# Patient Record
Sex: FEMALE | Race: WHITE | NOT HISPANIC OR LATINO | Employment: FULL TIME | URBAN - METROPOLITAN AREA
[De-identification: names, ages, dates, MRNs, and addresses within clinical notes are randomized per-mention and may not be internally consistent; named-entity substitution may affect disease eponyms.]

---

## 2023-10-02 ENCOUNTER — ANCILLARY PROCEDURE (OUTPATIENT)
Dept: RADIOLOGY | Facility: CLINIC | Age: 63
End: 2023-10-02
Payer: COMMERCIAL

## 2023-10-02 VITALS — BODY MASS INDEX: 22.66 KG/M2 | HEIGHT: 66 IN | WEIGHT: 141 LBS

## 2023-10-02 DIAGNOSIS — Z12.31 ENCOUNTER FOR SCREENING MAMMOGRAM FOR MALIGNANT NEOPLASM OF BREAST: ICD-10-CM

## 2023-10-02 PROCEDURE — 77067 SCR MAMMO BI INCL CAD: CPT | Mod: 50

## 2023-10-02 PROCEDURE — 77063 BREAST TOMOSYNTHESIS BI: CPT | Mod: BILATERAL PROCEDURE | Performed by: RADIOLOGY

## 2023-10-02 PROCEDURE — 77063 BREAST TOMOSYNTHESIS BI: CPT

## 2023-10-02 PROCEDURE — 77067 SCR MAMMO BI INCL CAD: CPT | Mod: BILATERAL PROCEDURE | Performed by: RADIOLOGY

## 2024-03-04 ENCOUNTER — OFFICE VISIT (OUTPATIENT)
Dept: PAIN MEDICINE | Facility: HOSPITAL | Age: 64
End: 2024-03-04
Payer: COMMERCIAL

## 2024-03-04 VITALS
BODY MASS INDEX: 23.22 KG/M2 | RESPIRATION RATE: 18 BRPM | SYSTOLIC BLOOD PRESSURE: 125 MMHG | HEIGHT: 66 IN | WEIGHT: 144.5 LBS | DIASTOLIC BLOOD PRESSURE: 69 MMHG | HEART RATE: 75 BPM

## 2024-03-04 DIAGNOSIS — M54.16 LUMBAR NEURITIS: ICD-10-CM

## 2024-03-04 DIAGNOSIS — M46.1 SACROILIITIS (CMS-HCC): Primary | ICD-10-CM

## 2024-03-04 DIAGNOSIS — M96.1 POSTLAMINECTOMY SYNDROME: ICD-10-CM

## 2024-03-04 PROCEDURE — 1125F AMNT PAIN NOTED PAIN PRSNT: CPT | Performed by: PHYSICAL MEDICINE & REHABILITATION

## 2024-03-04 PROCEDURE — 99204 OFFICE O/P NEW MOD 45 MIN: CPT | Performed by: PHYSICAL MEDICINE & REHABILITATION

## 2024-03-04 PROCEDURE — 1036F TOBACCO NON-USER: CPT | Performed by: PHYSICAL MEDICINE & REHABILITATION

## 2024-03-04 PROCEDURE — 1159F MED LIST DOCD IN RCRD: CPT | Performed by: PHYSICAL MEDICINE & REHABILITATION

## 2024-03-04 PROCEDURE — 99214 OFFICE O/P EST MOD 30 MIN: CPT | Performed by: PHYSICAL MEDICINE & REHABILITATION

## 2024-03-04 RX ORDER — FLUOXETINE HYDROCHLORIDE 40 MG/1
40 CAPSULE ORAL DAILY
COMMUNITY

## 2024-03-04 RX ORDER — ATORVASTATIN CALCIUM 40 MG/1
40 TABLET, FILM COATED ORAL DAILY
COMMUNITY

## 2024-03-04 RX ORDER — LOSARTAN POTASSIUM AND HYDROCHLOROTHIAZIDE 12.5; 5 MG/1; MG/1
1 TABLET ORAL DAILY
COMMUNITY

## 2024-03-04 RX ORDER — HYDROCHLOROTHIAZIDE 25 MG/1
50 TABLET ORAL DAILY
COMMUNITY
End: 2024-05-30 | Stop reason: ALTCHOICE

## 2024-03-04 ASSESSMENT — PATIENT HEALTH QUESTIONNAIRE - PHQ9
SUM OF ALL RESPONSES TO PHQ9 QUESTIONS 1 AND 2: 0
2. FEELING DOWN, DEPRESSED OR HOPELESS: NOT AT ALL
1. LITTLE INTEREST OR PLEASURE IN DOING THINGS: NOT AT ALL

## 2024-03-04 ASSESSMENT — ENCOUNTER SYMPTOMS
LOSS OF SENSATION IN FEET: 0
DEPRESSION: 0
OCCASIONAL FEELINGS OF UNSTEADINESS: 1

## 2024-03-04 ASSESSMENT — PAIN SCALES - GENERAL: PAINLEVEL: 4

## 2024-03-04 NOTE — PROGRESS NOTES
New pt c/o low back pain, bilat hips, left sciatica, radiates to left leg-to mid thigh at times. Had microdiscectomy about 15 yrs ago. Problems started started to become worse last 3 yrs. Tries to walk or if stands too long makes worse. States about 4 mths ago if sits too long, feels pain left sciatica area when stands up-describes as sharp, affects walking.    Pain Score 4/10  Meds-tylenol-helps some    Had hip x-rayed Dec 2023, Had MRI 1/11/24  PT for 6 weeks last fall 2023. States did not help. Ices back every AM.    OA 3/4/24  Oswestry Score: 18    Subjective   Patient ID: Luba Humphreys is a 63 y.o. female who presents for Back Pain (Low back, bilat hips, left sciatica).  HPI    Pleasant 63-year-old female with lower back pain with some radiation to her left buttock and thigh.  Patient has a history of L5-S1 microdiscectomy about 15 years ago.  Last year or so she started noticing increasing pain as above.  Pain is worse with transitioning from sitting to standing as well as moving around the bed.  Denies any lower extremity weakness, denies any bowel or bladder changes.  She is taken Tylenol which does help and is not really interested in any other medications at this time consistently.  She has previously done physical therapy at the end of last year and is continuing to do her home stretching and exercises.                  Monitoring and compliance:    ORT:    PDUQ:    Office Agreement: 3/4/24  Oswestry score: 18      Review of Systems   All other systems reviewed and are negative.       Current Outpatient Medications   Medication Instructions    atorvastatin (LIPITOR) 40 mg, oral, Daily    FLUoxetine (PROZAC) 40 mg, oral, Daily    hydroCHLOROthiazide (HYDRODIURIL) 50 mg, oral, Daily        Past Medical History:   Diagnosis Date    Personal history of other diseases of the circulatory system     History of hypertension    Personal history of other endocrine, nutritional and metabolic disease     History  of hyperlipidemia        Past Surgical History:   Procedure Laterality Date    BREAST CYST ASPIRATION Bilateral     OTHER SURGICAL HISTORY  2020     section    OTHER SURGICAL HISTORY  2020    Appendectomy    OTHER SURGICAL HISTORY  2020    Back surgery    OTHER SURGICAL HISTORY  2020    Hand surgery    OTHER SURGICAL HISTORY  2022    Cystoscopy        Family History   Problem Relation Name Age of Onset    Throat cancer Maternal Grandmother          Allergies   Allergen Reactions    Ibuprofen Hives and Itching        Objective     Vitals:    24 1005   BP: 125/69   Pulse: 75   Resp: 18        Physical Exam    GENERAL EXAM  Vital Signs: Vital signs to include heart rate, respiration rate, blood pressure, and temperature were reviewed.  General Appearance:  Awake, alert, healthy appearing, well developed, No acute distress.  Head: Normocephalic without evidence of head injury.  Neck: The appearance of the neck was normal without swelling with a midline trachea.  Eyes: The eyelids and eyebrows exhibited no abnormalities.  Pupils were not pin-point.  Sclera was without icterus.  Lungs: Respiration rhythm and depth was normal.  Respiratory movements were normal without labored breathing.  Cardiovascular: No peripheral edema was present.    Neurological: Patient was oriented to time, place, and person.  Speech was normal.  Balance, gait, and stance were unremarkable.    Psychiatric: Appearance was normal with appropriate dress.  Mood was euthymic and affect was normal.  Skin: Affected regions were without ecchymosis or skin lesions.    Back (MSK/neuro/skin):  Full active and passive range of motion in all planes  Strength 5 out of 5 bilateral lower extremities  Sensation to light-touch grossly intact bilateral lower extremities  Deep tendon reflexes equal bilateral lower extremities  No edema/effusion/erythema  Skin: no skin lesions or scars  B SLR (-)  B sacral spring test (-),    B facet load (-)  B SIJ tenderness (-)   B IJEOMA (-)  Full flexion/extension  No TTP, no muscle spasm over lumbar paraspinals    Physical exam as above except:  Positive Nyasia finger bilaterally, positive Ijeoma bilaterally, positive Gaenslen's bilaterally, positive sacral thrust bilaterally, some tenderness palpation over lumbar paraspinals.      Assessment/Plan   Diagnoses and all orders for this visit:  Sacroiliitis (CMS/HCC)  -     Sacroiliac Joint Injection; Future  Postlaminectomy syndrome  Lumbar neuritis    63-year-old female with history of prior L5-S1 microdiscectomy.  I did personally review patient's MRI of her lumbar spine as she did bring in the disc has was done at an outside facility which showed some degenerative changes worse at L1-2 with some disc desiccation as well as some type II Modic changes at that level with some mild central canal stenosis.  There is no high-grade foraminal or canal stenosis though.  Her symptoms are more consistent today with left greater than right sacroiliitis given positive findings on physical exam as well as her history.  Given she has failed conservative treatment to include physical therapy as well as over-the-counter medications it would be reasonable to proceed with bilateral SI joint injection.  Plan for today:  - Schedule patient for bilateral sacroiliac joint injection.  - If patient does not have relief of some of the radicular symptoms some of this may be coming from more the L1-2 degenerative changes so we could potentially do epidural steroid injections in the future though I do suspect that her primary pain is the SI joint pain.  - Continue with home physical therapy.  - Could consider the addition of a neuromodulator such as gabapentin in the future.         This note was generated with the aid of dictation software, there may be typos despite my attempts at proofreading.

## 2024-03-14 ENCOUNTER — HOSPITAL ENCOUNTER (OUTPATIENT)
Dept: GASTROENTEROLOGY | Facility: HOSPITAL | Age: 64
Discharge: HOME | End: 2024-03-14
Payer: COMMERCIAL

## 2024-03-14 ENCOUNTER — HOSPITAL ENCOUNTER (OUTPATIENT)
Dept: RADIOLOGY | Facility: HOSPITAL | Age: 64
Discharge: HOME | End: 2024-03-14
Payer: COMMERCIAL

## 2024-03-14 VITALS
TEMPERATURE: 98.5 F | RESPIRATION RATE: 16 BRPM | SYSTOLIC BLOOD PRESSURE: 133 MMHG | HEIGHT: 66 IN | HEART RATE: 54 BPM | OXYGEN SATURATION: 100 % | BODY MASS INDEX: 22.5 KG/M2 | WEIGHT: 140 LBS | DIASTOLIC BLOOD PRESSURE: 69 MMHG

## 2024-03-14 DIAGNOSIS — M46.1 SACROILIITIS (CMS-HCC): ICD-10-CM

## 2024-03-14 PROCEDURE — 2500000004 HC RX 250 GENERAL PHARMACY W/ HCPCS (ALT 636 FOR OP/ED): Performed by: PHYSICAL MEDICINE & REHABILITATION

## 2024-03-14 PROCEDURE — 27096 INJECT SACROILIAC JOINT: CPT | Mod: 50 | Performed by: PHYSICAL MEDICINE & REHABILITATION

## 2024-03-14 PROCEDURE — 2550000001 HC RX 255 CONTRASTS: Performed by: PHYSICAL MEDICINE & REHABILITATION

## 2024-03-14 PROCEDURE — 27096 INJECT SACROILIAC JOINT: CPT | Performed by: PHYSICAL MEDICINE & REHABILITATION

## 2024-03-14 PROCEDURE — 2500000005 HC RX 250 GENERAL PHARMACY W/O HCPCS: Performed by: PHYSICAL MEDICINE & REHABILITATION

## 2024-03-14 RX ORDER — METHYLPREDNISOLONE ACETATE 40 MG/ML
INJECTION, SUSPENSION INTRA-ARTICULAR; INTRALESIONAL; INTRAMUSCULAR; SOFT TISSUE AS NEEDED
Status: COMPLETED | OUTPATIENT
Start: 2024-03-14 | End: 2024-03-14

## 2024-03-14 RX ORDER — LIDOCAINE HYDROCHLORIDE 5 MG/ML
INJECTION, SOLUTION INFILTRATION; INTRAVENOUS AS NEEDED
Status: COMPLETED | OUTPATIENT
Start: 2024-03-14 | End: 2024-03-14

## 2024-03-14 RX ADMIN — METHYLPREDNISOLONE ACETATE 40 MG: 40 INJECTION, SUSPENSION INTRA-ARTICULAR; INTRALESIONAL; INTRAMUSCULAR; SOFT TISSUE at 10:17

## 2024-03-14 RX ADMIN — LIDOCAINE HYDROCHLORIDE 15 ML: 5 INJECTION, SOLUTION INFILTRATION at 10:13

## 2024-03-14 RX ADMIN — IOHEXOL 5 ML: 350 INJECTION, SOLUTION INTRAVENOUS at 10:16

## 2024-03-14 ASSESSMENT — PAIN SCALES - GENERAL
PAINLEVEL_OUTOF10: 0 - NO PAIN
PAINLEVEL_OUTOF10: 5 - MODERATE PAIN

## 2024-03-14 ASSESSMENT — PAIN - FUNCTIONAL ASSESSMENT
PAIN_FUNCTIONAL_ASSESSMENT: 0-10
PAIN_FUNCTIONAL_ASSESSMENT: 0-10

## 2024-03-14 ASSESSMENT — COLUMBIA-SUICIDE SEVERITY RATING SCALE - C-SSRS
1. IN THE PAST MONTH, HAVE YOU WISHED YOU WERE DEAD OR WISHED YOU COULD GO TO SLEEP AND NOT WAKE UP?: NO
6. HAVE YOU EVER DONE ANYTHING, STARTED TO DO ANYTHING, OR PREPARED TO DO ANYTHING TO END YOUR LIFE?: NO
2. HAVE YOU ACTUALLY HAD ANY THOUGHTS OF KILLING YOURSELF?: NO

## 2024-03-14 ASSESSMENT — PAIN DESCRIPTION - DESCRIPTORS: DESCRIPTORS: ACHING

## 2024-03-14 NOTE — H&P
HISTORY AND PHYSICAL    History Of Present Illness  Luba Humphreys is a 63 y.o. female presenting with chronic pain.  Here for bilateral sacroiliac joint injection    she denies any recent antibiotic use or infections, she denies any blood thinner use , and she denies contrast or local anesthetic allergies     Past Medical History  Past Medical History:   Diagnosis Date    Chronic back pain     Personal history of other diseases of the circulatory system     History of hypertension    Personal history of other endocrine, nutritional and metabolic disease     History of hyperlipidemia       Surgical History  Past Surgical History:   Procedure Laterality Date    BREAST CYST ASPIRATION Bilateral     OTHER SURGICAL HISTORY  2020     section    OTHER SURGICAL HISTORY  2020    Appendectomy    OTHER SURGICAL HISTORY  2020    Back surgery    OTHER SURGICAL HISTORY  2020    Hand surgery    OTHER SURGICAL HISTORY  2022    Cystoscopy        Social History  She reports that she has never smoked. She has never used smokeless tobacco. She reports current alcohol use of about 1.0 standard drink of alcohol per week. She reports that she does not use drugs.    Family History  Family History   Problem Relation Name Age of Onset    Throat cancer Maternal Grandmother          Allergies  Ibuprofen    Review of Systems   12 point ROS done and negative except for the above.   Physical Exam     General: NAD, well groomed, well nourished  Eyes: Non-icteric sclera, EOMI  Ears, Nose, Mouth, and Throat: External ears and nose appear to be without deformity or rash. No lesions or masses noted. Hearing is grossly intact.   Neck: Trachea midline  Respiratory: Nonlabored breathing   Cardiovascular: No peripheral edema   Skin: No rashes or open lesions/ulcers identified on skin.    Last Recorded Vitals  Blood pressure 129/82, pulse 60, temperature 36.9 °C (98.5 °F), temperature source Temporal, resp. rate 16,  "height 1.676 m (5' 6\"), weight 63.5 kg (140 lb), SpO2 100 %.    Relevant Results           Assessment/Plan       Risks, benefits, alternatives discussed. All questions answered to the best of my ability. Patient agrees to proceed.   -We will proceed with planned procedure        Azeem Wahl MD  Chronic Pain Fellow  [facility]    "

## 2024-03-14 NOTE — PROCEDURES
Joint Aspiration/Injection    Date/Time: 3/14/2024 10:13 AM    Performed by: Brain Awad MD  Authorized by: Brain Awad MD    Consent:     Consent obtained:  Written    Consent given by:  Patient    Risks, benefits, and alternatives were discussed: yes      Risks discussed:  Bleeding, infection, pain and nerve damage    Alternatives discussed:  No treatment, delayed treatment and alternative treatment  Universal protocol:     Procedure explained and questions answered to patient or proxy's satisfaction: yes      Relevant documents present and verified: yes      Test results available: yes      Imaging studies available: yes      Required blood products, implants, devices, and special equipment available: yes      Site/side marked: yes      Immediately prior to procedure, a time out was called: yes      Patient identity confirmed:  Verbally with patient, hospital-assigned identification number and arm band  Comments:      SI joint injection    Following informed consent, the patient was operating room and placed in the prone position. The low back area was prepped and draped with chlorhexidine in sterile fashion.  Using fluoroscopic guidance, the skin and subcutaneous tissue overlying needle trajectory to the lower aspect of the sacroiliac joint was anesthetized with 3 cc of 0.5% Lidocaine.  A 22-gauge spinal needle was then advanced under fluoroscopic guidance the lower aspect of the sacroiliac joint.  Injection of a small amount of contrast with appropriate intra-articular/periarticular spread.  Thereafter the below medications were injected and the needle was removed.  The patient was then transferred to the recovery room in stable condition.    The patient is to continue with physical therapy/home exercises and update us on response/progress.    Laterality: Bilateral  Medication(s):  [3mL] [0.5% lidocaine] [40 mg methylprednisolone] divided between site(s)

## 2024-03-14 NOTE — DISCHARGE INSTRUCTIONS
You had a pain management procedure today.    Observe/ monitor for the following signs & symptoms:  If you notice Excessive bleeding (slow general oozing that completely soaks the dressing, or fresh bright red bleeding).   In either case, apply pressure to the area, elevate it if possible & call your doctor at once.    Also observe for:  Change in color  Numbness/tingling  Coldness to the touch  Swelling  Drainage  Temperature of 101.5 or higher.  Increased, uncontrollable pain.    *If you notice the above signs & symptoms, please call your doctor right away!*      Discharge Instructions:    Your pain may not be gone immediately after this procedure; it generally takes 3 to 5 days for the steroid to work.   Keep the needle site clean & dry for 24 hours.  Continue your present medications.  Make an appointment to see your doctor in 2-3 weeks.  If any problems occur, or if you have any further questions, please call as soon as possible. If you find that you cannot reach your doctor, but feel that the condition nees a doctor's attention, go to the closest emergency department & take this discharge paper with you.       Dr. Awad's Office: [phone]

## 2024-03-14 NOTE — H&P
Patient ID: Patient with sacroiliitis who presents for the scheduled procedure.  No changes since last visit      Patient denies any recent antibiotic use or infections, denies any blood thinner use, and denies contrast or local anesthetic allergies           GENERAL EXAM  Vital Signs: Vital signs to include heart rate, respiration rate, blood pressure, and temperature were reviewed.  General Appearance:  Awake, alert, healthy appearing, well developed, No acute distress.  Head: Normocephalic without evidence of head injury.  Neck: The appearance of the neck was normal without swelling with a midline trachea.  Eyes: The eyelids and eyebrows exhibited no abnormalities.  Pupils were not pin-point.  Sclera was without icterus.  Lungs: Respiration rhythm and depth was normal.  Respiratory movements were normal without labored breathing.  Cardiovascular: No peripheral edema was present.    Neurological: Patient was oriented to time, place, and person.  Speech was normal.  Balance, gait, and stance were unremarkable.    Psychiatric: Appearance was normal with appropriate dress.  Mood was euthymic and affect was normal.  Skin: Affected regions were without ecchymosis or skin lesions.        Physical exam as above except:        Assessment/Plan    Patient with sacroiliitis here for bilateral SI joint injection

## 2024-03-18 ENCOUNTER — HOSPITAL ENCOUNTER (OUTPATIENT)
Dept: RADIOLOGY | Facility: EXTERNAL LOCATION | Age: 64
Discharge: HOME | End: 2024-03-18

## 2024-04-11 ENCOUNTER — OFFICE VISIT (OUTPATIENT)
Dept: PAIN MEDICINE | Facility: HOSPITAL | Age: 64
End: 2024-04-11
Payer: COMMERCIAL

## 2024-04-11 VITALS
DIASTOLIC BLOOD PRESSURE: 68 MMHG | OXYGEN SATURATION: 96 % | HEIGHT: 66 IN | SYSTOLIC BLOOD PRESSURE: 101 MMHG | BODY MASS INDEX: 23.13 KG/M2 | HEART RATE: 74 BPM | RESPIRATION RATE: 16 BRPM | WEIGHT: 143.9 LBS

## 2024-04-11 DIAGNOSIS — M96.1 POSTLAMINECTOMY SYNDROME: ICD-10-CM

## 2024-04-11 DIAGNOSIS — M46.1 SACROILIITIS (CMS-HCC): ICD-10-CM

## 2024-04-11 DIAGNOSIS — M54.16 LUMBAR NEURITIS: Primary | ICD-10-CM

## 2024-04-11 PROCEDURE — 1036F TOBACCO NON-USER: CPT | Performed by: CLINICAL NURSE SPECIALIST

## 2024-04-11 PROCEDURE — 99214 OFFICE O/P EST MOD 30 MIN: CPT | Performed by: CLINICAL NURSE SPECIALIST

## 2024-04-11 PROCEDURE — 1159F MED LIST DOCD IN RCRD: CPT | Performed by: CLINICAL NURSE SPECIALIST

## 2024-04-11 PROCEDURE — 1160F RVW MEDS BY RX/DR IN RCRD: CPT | Performed by: CLINICAL NURSE SPECIALIST

## 2024-04-11 ASSESSMENT — COLUMBIA-SUICIDE SEVERITY RATING SCALE - C-SSRS
2. HAVE YOU ACTUALLY HAD ANY THOUGHTS OF KILLING YOURSELF?: NO
1. IN THE PAST MONTH, HAVE YOU WISHED YOU WERE DEAD OR WISHED YOU COULD GO TO SLEEP AND NOT WAKE UP?: NO
6. HAVE YOU EVER DONE ANYTHING, STARTED TO DO ANYTHING, OR PREPARED TO DO ANYTHING TO END YOUR LIFE?: NO

## 2024-04-11 ASSESSMENT — PATIENT HEALTH QUESTIONNAIRE - PHQ9
2. FEELING DOWN, DEPRESSED OR HOPELESS: NOT AT ALL
1. LITTLE INTEREST OR PLEASURE IN DOING THINGS: NOT AT ALL
SUM OF ALL RESPONSES TO PHQ9 QUESTIONS 1 AND 2: 0

## 2024-04-11 ASSESSMENT — ENCOUNTER SYMPTOMS
OCCASIONAL FEELINGS OF UNSTEADINESS: 0
LOSS OF SENSATION IN FEET: 0
DEPRESSION: 0

## 2024-04-11 NOTE — ASSESSMENT & PLAN NOTE
63-year-old female with history of prior L5-S1 microdiscectomy.  Previous review of MRI consistent with degenerative changes worse at L1-2 with some disc desiccation as well as some type II Modic changes at that level with some mild central canal stenosis.  Previously completed formal physical therapy and consistently does home therapy exercises and stretches daily.  She has wanted to return to her gym, however, pain has limited her ability to do gym exercises. Symptoms were consistent with left greater than right sacroiliitis at her last office visit.  She does appear to have received some relief from her bilateral SI joint injection as she has less tenderness over her SI joints with negative provocative maneuvers.  She is experiencing persistent lumbar radicular symptoms most bothersome to left lower extremity.  Patient's symptoms and physical exam consistent with a lumbar neuritis.  Patient may benefit from a caudal epidural steroid injection targeting symptoms of lumbar neuritis.  Reviewed potential risks and benefits and the patient would like to proceed.  Plan reviewed with patient at today's visit.    -Scheduled for caudal epidural steroid injection under fluoroscopic guidance targeting symptoms of lumbar neuritis.  CPT 61086.  -The patient may benefit from a neuromodulator such as gabapentin in the future if needed.  Currently continues to manage her pain with occasional Tylenol.  -Patient will follow-up in our office approximately 4 weeks after her injection for reevaluation.     Disclaimer: This note was transcribed using an audio transcription device.  As such, minor errors may be present with regard to spelling, punctuation, and inadvertent word insertion.  Please disregard such errors.

## 2024-04-11 NOTE — PROGRESS NOTES
Subjective   Patient ID: Luba Humphreys is a 63 y.o. female who presents for lumbar radicular pain, SI pain      HPI    63-year-old female with chronic low back pain radiating to left buttock to left lower extremity presents for follow-up.  Patient has a history of an L5-S1 microdiscectomy about 15 years ago.  She has experienced increasing low back pain radiating into her left buttock and left thigh for the past year.  She has completed a formal course of physical therapy.  Continually does home therapy exercises and stretches daily.  Lumbar MRI consistent with degenerative changes worse at L1-2 with some disc desiccation as well as some type II Modic changes at that level with some mild central canal stenosis.  There is no high-grade foraminal or canal stenosis.  She has her pain with some Tylenol.  She was scheduled for bilateral SI joint injections at her last office visit targeting sacroiliitis.  She presents at today's office visit with low back pain which will radiate into bilateral buttocks/hips and into her left thigh.  Recent bilateral SI joint injection has provided some relief of left buttock pain.  She states that this pain is not as intense, however, she continues to experience left sided radicular symptoms.  Pain increases with going from seated to standing position, standing for and walking.  She denies any numbness/tingling or weakness in her lower extremities.  She denies any bowel or bladder changes.  Continues to do home therapy stretches and exercises daily.  She would like to return to her gym, however she has held off secondary to pain.    Patient presents to office for interval follow up. Patient is seen for lower back pain that radiates into the bilateral hips and intermittently down the left leg stopping at the midthigh. Pain is increased with walking. Patient presents for follow up post bilateral SI injection with minor relief approx 25% states that left sciatic pain is still constant  but at a subdued level.    Pain Score: 3/10    Other Medications/Neuromodulators: none    Injections/Procedures: bilateral SI 03/14/2024 with 25% relief    Other:  denies    OARRS:  Kimmy Espino, APRN-CNP, APRN-CNS on 4/11/2024  1:06 PM  I have personally reviewed the OARRS report for Luba Humphreys. I have considered the risks of abuse, dependence, addiction and diversion    Is the patient prescribed a combination of a benzodiazepine and opioid?  No    Last Urine Drug Screen / ordered today: No  No results found for this or any previous visit (from the past 8760 hour(s)).  N/A    Controlled Substance Agreement:  Date of the Last Agreement:  n/a      Monitoring and compliance:    ORT: n/a    PDUQ: n/a    Office Agreement: 03/04/2024      Review of Systems    ROS:   General: No fevers, chills, weight loss  Skin: Negative for lesions  Eyes: No acute vision changes  Ears: No vertigo  Nose, mouth, throat: No difficulty swallowing or speaking  Respiratory: No cough, shortness of breath, cyanosis  Cardiovascular: Negative for chest pain syncope or palpitation  Gastrointestinal: No constipation, nausea, vomiting  Neurological: Negative for headache,   Psychological: Negative for severe or debilitating anxiety, depression. Negative memory loss  Musculoskeletal: Positive for arthralgia, myalgia and pain  Endocrine: Negative for weight gain, appetite changes, excessive sweating  Allergy/immune: Negative    All 13 systems were reviewed and are within normal levels except as noted or in the history of present illness.  Positive or pertinent negative responses are noted or were in the history of present illness. As noted, the patient denies significant or impairing weakness in the bilateral upper and lower extremities, medication induced constipation, and bowel or bladder incontinence.     Current Outpatient Medications:     atorvastatin (Lipitor) 40 mg tablet, Take 1 tablet (40 mg) by mouth once daily., Disp: , Rfl:      "FLUoxetine (PROzac) 40 mg capsule, Take 1 capsule (40 mg) by mouth once daily., Disp: , Rfl:     hydroCHLOROthiazide (HYDRODiuril) 25 mg tablet, Take 2 tablets (50 mg) by mouth once daily., Disp: , Rfl:     losartan-hydrochlorothiazide (Hyzaar) 50-12.5 mg tablet, Take 1 tablet by mouth once daily., Disp: , Rfl:      Past Medical History:   Diagnosis Date    Chronic back pain     Personal history of other diseases of the circulatory system     History of hypertension    Personal history of other endocrine, nutritional and metabolic disease     History of hyperlipidemia        Past Surgical History:   Procedure Laterality Date    BREAST CYST ASPIRATION Bilateral     OTHER SURGICAL HISTORY  2020     section    OTHER SURGICAL HISTORY  2020    Appendectomy    OTHER SURGICAL HISTORY  2020    Back surgery    OTHER SURGICAL HISTORY  2020    Hand surgery    OTHER SURGICAL HISTORY  2022    Cystoscopy        Family History   Problem Relation Name Age of Onset    Throat cancer Maternal Grandmother          Allergies   Allergen Reactions    Ibuprofen Hives and Itching        Objective     Visit Vitals  /68   Pulse 74   Resp 16   Ht 1.676 m (5' 6\")   Wt 65.3 kg (143 lb 14.4 oz)   LMP  (LMP Unknown)   SpO2 96%   BMI 23.23 kg/m²   OB Status Postmenopausal   Smoking Status Never   BSA 1.74 m²        Physical Exam    PE:  General: Well-developed, well-nourished, no acute distress. The patient demonstrates no pain behavior, symptom magnification or overt drug-seeking behavior.  Eye: Pupils appropriate for room lighting  Neck/thyroid: No obvious goiter or enlargement of neck noted  Respiratory exam: Normal respiratory effort, unlabored respiration. No accessory muscle use noted  Cardiac exam: Bilateral radial pulses intact  Abdominal: Nondistended  Spine, lumbar: The patient is able to rise from a seated to standing position without hesitancy, push off, or delay. Gait is grossly nonantalgic. " Tenderness to paraspinous musculature is noted lower lumbar spine. Flexion and extension intact.  Extension increasing pain to left side low back. Positive SLR LLE. Mild tenderness over L SIJ.  Negative Ijeoma, Gaenslen's and sacral thrust bilaterally.   Neurologic exam: Muscle strength is antigravity in all 4 extremities.  Equal muscle strength bilateral lower extremities 5/5.  Psychiatric exam: Judgment and insight normal, affect normal, speech is fluent, affect appropriate, demonstrating no signs of hypersomnolence, sedation, or confusion      Assessment/Plan   Problem List Items Addressed This Visit             ICD-10-CM    Postlaminectomy syndrome M96.1    Relevant Orders    Epidural Steroid Injection    Lumbar neuritis - Primary M54.16     63-year-old female with history of prior L5-S1 microdiscectomy.  Previous review of MRI consistent with degenerative changes worse at L1-2 with some disc desiccation as well as some type II Modic changes at that level with some mild central canal stenosis.  Previously completed formal physical therapy and consistently does home therapy exercises and stretches daily.  She has wanted to return to her gym, however, pain has limited her ability to do gym exercises. Symptoms were consistent with left greater than right sacroiliitis at her last office visit.  She does appear to have received some relief from her bilateral SI joint injection as she has less tenderness over her SI joints with negative provocative maneuvers.  She is experiencing persistent lumbar radicular symptoms most bothersome to left lower extremity.  Patient's symptoms and physical exam consistent with a lumbar neuritis.  Patient may benefit from a caudal epidural steroid injection targeting symptoms of lumbar neuritis.  Reviewed potential risks and benefits and the patient would like to proceed.  Plan reviewed with patient at today's visit.    -Scheduled for caudal epidural steroid injection under fluoroscopic  guidance targeting symptoms of lumbar neuritis.  CPT 04570.  -The patient may benefit from a neuromodulator such as gabapentin in the future if needed.  Currently continues to manage her pain with occasional Tylenol.  -Patient will follow-up in our office approximately 4 weeks after her injection for reevaluation.     Disclaimer: This note was transcribed using an audio transcription device.  As such, minor errors may be present with regard to spelling, punctuation, and inadvertent word insertion.  Please disregard such errors.           Relevant Orders    Epidural Steroid Injection    Sacroiliitis (CMS/[facility]) M46.1

## 2024-05-09 ENCOUNTER — HOSPITAL ENCOUNTER (OUTPATIENT)
Dept: RADIOLOGY | Facility: HOSPITAL | Age: 64
Discharge: HOME | End: 2024-05-09
Payer: COMMERCIAL

## 2024-05-09 ENCOUNTER — HOSPITAL ENCOUNTER (OUTPATIENT)
Dept: GASTROENTEROLOGY | Facility: HOSPITAL | Age: 64
Discharge: HOME | End: 2024-05-09
Payer: COMMERCIAL

## 2024-05-09 VITALS
DIASTOLIC BLOOD PRESSURE: 77 MMHG | SYSTOLIC BLOOD PRESSURE: 139 MMHG | TEMPERATURE: 97.8 F | OXYGEN SATURATION: 97 % | RESPIRATION RATE: 16 BRPM | HEART RATE: 61 BPM

## 2024-05-09 DIAGNOSIS — M96.1 POSTLAMINECTOMY SYNDROME: ICD-10-CM

## 2024-05-09 DIAGNOSIS — M54.16 LUMBAR NEURITIS: ICD-10-CM

## 2024-05-09 PROCEDURE — 7100000010 HC PHASE TWO TIME - EACH INCREMENTAL 1 MINUTE

## 2024-05-09 PROCEDURE — 7100000009 HC PHASE TWO TIME - INITIAL BASE CHARGE

## 2024-05-09 PROCEDURE — 62323 NJX INTERLAMINAR LMBR/SAC: CPT | Performed by: PHYSICAL MEDICINE & REHABILITATION

## 2024-05-09 PROCEDURE — 2500000004 HC RX 250 GENERAL PHARMACY W/ HCPCS (ALT 636 FOR OP/ED): Performed by: PHYSICAL MEDICINE & REHABILITATION

## 2024-05-09 PROCEDURE — 2500000005 HC RX 250 GENERAL PHARMACY W/O HCPCS: Performed by: PHYSICAL MEDICINE & REHABILITATION

## 2024-05-09 PROCEDURE — 2550000001 HC RX 255 CONTRASTS: Performed by: PHYSICAL MEDICINE & REHABILITATION

## 2024-05-09 RX ORDER — METHYLPREDNISOLONE ACETATE 40 MG/ML
INJECTION, SUSPENSION INTRA-ARTICULAR; INTRALESIONAL; INTRAMUSCULAR; SOFT TISSUE AS NEEDED
Status: COMPLETED | OUTPATIENT
Start: 2024-05-09 | End: 2024-05-09

## 2024-05-09 RX ORDER — LIDOCAINE HYDROCHLORIDE 5 MG/ML
INJECTION, SOLUTION INFILTRATION; INTRAVENOUS AS NEEDED
Status: COMPLETED | OUTPATIENT
Start: 2024-05-09 | End: 2024-05-09

## 2024-05-09 RX ADMIN — METHYLPREDNISOLONE ACETATE 40 MG: 40 INJECTION, SUSPENSION INTRA-ARTICULAR; INTRALESIONAL; INTRAMUSCULAR; SOFT TISSUE at 08:29

## 2024-05-09 RX ADMIN — LIDOCAINE HYDROCHLORIDE 15 ML: 5 INJECTION, SOLUTION INFILTRATION at 08:28

## 2024-05-09 RX ADMIN — IOHEXOL 5 ML: 350 INJECTION, SOLUTION INTRAVENOUS at 08:29

## 2024-05-09 ASSESSMENT — PAIN SCALES - GENERAL
PAINLEVEL_OUTOF10: 4
PAINLEVEL_OUTOF10: 3

## 2024-05-09 ASSESSMENT — PAIN - FUNCTIONAL ASSESSMENT: PAIN_FUNCTIONAL_ASSESSMENT: 0-10

## 2024-05-09 NOTE — H&P
Patient ID: Patient with postlaminectomy syndrome who presents for the scheduled procedure.  No changes since last visit      Patient denies any recent antibiotic use or infections, denies any blood thinner use, and denies contrast or local anesthetic allergies           GENERAL EXAM  Vital Signs: Vital signs to include heart rate, respiration rate, blood pressure, and temperature were reviewed.  General Appearance:  Awake, alert, healthy appearing, well developed, No acute distress.  Head: Normocephalic without evidence of head injury.  Neck: The appearance of the neck was normal without swelling with a midline trachea.  Eyes: The eyelids and eyebrows exhibited no abnormalities.  Pupils were not pin-point.  Sclera was without icterus.  Lungs: Respiration rhythm and depth was normal.  Respiratory movements were normal without labored breathing.  Cardiovascular: No peripheral edema was present.    Neurological: Patient was oriented to time, place, and person.  Speech was normal.  Balance, gait, and stance were unremarkable.    Psychiatric: Appearance was normal with appropriate dress.  Mood was euthymic and affect was normal.  Skin: Affected regions were without ecchymosis or skin lesions.        Physical exam as above except:        Assessment/Plan    Patient with postlaminectomy syndrome here for caudal epidural steroid injection

## 2024-05-09 NOTE — PROCEDURES
General    Date/Time: 5/9/2024 8:31 AM    Performed by: Brain Awad MD  Authorized by: Brain Awad MD    Consent:     Consent obtained:  Written    Consent given by:  Patient    Risks, benefits, and alternatives were discussed: yes      Risks discussed:  Bleeding, infection, pain and nerve damage    Alternatives discussed:  No treatment, delayed treatment and alternative treatment  Universal protocol:     Procedure explained and questions answered to patient or proxy's satisfaction: yes      Relevant documents present and verified: yes      Test results available: yes      Imaging studies available: yes      Required blood products, implants, devices, and special equipment available: yes      Site/side marked: yes      Immediately prior to procedure, a time out was called: yes      Patient identity confirmed:  Verbally with patient, hospital-assigned identification number and arm band  Procedure specific details:      Caudal SHRUTHI    The patient was positioned comfortably into the supine position and was prepped and draped in the usual sterile manner.  Sterile precautions, including sterile gloves, disposable cap and masks were worn for the procedure at all times. Skeletal landmarks were identified under fluoroscopy. There was no evidence of infection at the site(s) of needle insertion.  A final time-out was done.  The skin and subcutaneous tissue at insertion site was anesthetized with 1% lidocaine with or without sodium bicarbonate.  Under fluoroscopic guidance, the needle listed below was placed into the epidural space through the caudal approach.  If an Epimed Catheter was used it was advanced to the below level.  Radio-opaque contrast was utilized to confirm position.  Appropriate epidural spread was observed without vascular uptake or spread suggestive of an intrathecal injection.   Blood was not aspirated.  CSF was not aspirated.  Paresthesias were not elicited.  A therapeutic solution as indicated  below was injected.  If used, the catheter was flushed and the catheter and needle were removed as a unit with tip of the catheter noted to be intact.  Pressure was held over the site until hemostasis was achieved, and after ensuring hemostasis and the absence of hematoma, an adhesive bandage was applied to the site of needle insertion.  Preservative-free solutions were utilized in the epidural space.      Needle type and catheter: [22 gauge Quincke Spinal]    Level catheter advanced if used: [N/A]  Medications [9 mL of 0.5% lidocaine and 40 mg methylprednisolone]

## 2024-05-09 NOTE — ADDENDUM NOTE
Encounter addended by: Hilton Ramírez RN on: 5/9/2024 2:37 PM   Actions taken: Check Out activity completed

## 2024-05-09 NOTE — DISCHARGE INSTRUCTIONS
You had a pain management procedure today.    Observe/ monitor for the following signs & symptoms:  If you notice Excessive bleeding (slow general oozing that completely soaks the dressing, or fresh bright red bleeding).   In either case, apply pressure to the area, elevate it if possible & call your doctor at once.    Also observe for:  Change in color  Numbness/tingling  Coldness to the touch  Swelling  Drainage  Temperature of 101.5 or higher.  Increased, uncontrollable pain.    *If you notice the above signs & symptoms, please call your doctor right away!*      Discharge Instructions:    Your pain may not be gone immediately after this procedure; it generally takes 3 to 5 days for the steroid to work.   Keep the needle site clean & dry for 24 hours.  Continue your present medications.  Make an appointment to see your doctor in 2-3 weeks.  If any problems occur, or if you have any further questions, please call as soon as possible. If you find that you cannot reach your doctor, but feel that the condition nees a doctor's attention, go to the closest emergency department & take this discharge paper with you.       Dr. Awad's Office: [phone]

## 2024-05-30 ENCOUNTER — OFFICE VISIT (OUTPATIENT)
Dept: PAIN MEDICINE | Facility: HOSPITAL | Age: 64
End: 2024-05-30
Payer: COMMERCIAL

## 2024-05-30 VITALS
HEIGHT: 66 IN | DIASTOLIC BLOOD PRESSURE: 68 MMHG | WEIGHT: 143 LBS | HEART RATE: 65 BPM | RESPIRATION RATE: 16 BRPM | SYSTOLIC BLOOD PRESSURE: 107 MMHG | OXYGEN SATURATION: 98 % | BODY MASS INDEX: 22.98 KG/M2

## 2024-05-30 DIAGNOSIS — M46.1 SACROILIITIS (CMS-HCC): ICD-10-CM

## 2024-05-30 DIAGNOSIS — M54.16 LUMBAR NEURITIS: ICD-10-CM

## 2024-05-30 DIAGNOSIS — M96.1 POSTLAMINECTOMY SYNDROME: Primary | ICD-10-CM

## 2024-05-30 PROCEDURE — 1036F TOBACCO NON-USER: CPT | Performed by: CLINICAL NURSE SPECIALIST

## 2024-05-30 PROCEDURE — 99214 OFFICE O/P EST MOD 30 MIN: CPT | Performed by: CLINICAL NURSE SPECIALIST

## 2024-05-30 PROCEDURE — 1160F RVW MEDS BY RX/DR IN RCRD: CPT | Performed by: CLINICAL NURSE SPECIALIST

## 2024-05-30 PROCEDURE — 1159F MED LIST DOCD IN RCRD: CPT | Performed by: CLINICAL NURSE SPECIALIST

## 2024-05-30 ASSESSMENT — PATIENT HEALTH QUESTIONNAIRE - PHQ9
1. LITTLE INTEREST OR PLEASURE IN DOING THINGS: NOT AT ALL
2. FEELING DOWN, DEPRESSED OR HOPELESS: NOT AT ALL
SUM OF ALL RESPONSES TO PHQ9 QUESTIONS 1 AND 2: 0

## 2024-05-30 ASSESSMENT — COLUMBIA-SUICIDE SEVERITY RATING SCALE - C-SSRS
6. HAVE YOU EVER DONE ANYTHING, STARTED TO DO ANYTHING, OR PREPARED TO DO ANYTHING TO END YOUR LIFE?: NO
1. IN THE PAST MONTH, HAVE YOU WISHED YOU WERE DEAD OR WISHED YOU COULD GO TO SLEEP AND NOT WAKE UP?: NO
2. HAVE YOU ACTUALLY HAD ANY THOUGHTS OF KILLING YOURSELF?: NO

## 2024-05-30 ASSESSMENT — ENCOUNTER SYMPTOMS
OCCASIONAL FEELINGS OF UNSTEADINESS: 0
DEPRESSION: 0
LOSS OF SENSATION IN FEET: 0

## 2024-05-30 NOTE — PROGRESS NOTES
Subjective   Patient ID: Luba Humphreys is a 63 y.o. female who presents for lumbar radicular pain    HPI    63-year-old female with chronic low back pain radiating into lower extremities left greater than right presents for follow-up.  Patient has a history of an L5-S1 microdiscectomy about 15 years ago.  She had noted an increase in low back pain radiating into her left buttock and left lower extremity proximal to her knee.  She has completed formal physical therapy and continues to do home therapy exercises and stretches consistently.  Lumbar MRI consistent with degenerative changes worse at L1-2 with some disc desiccation as well as some type II Modic changes at that level with mild central canal stenosis; no high-grade foraminal or canal stenosis.  Previous bilateral SI joint injections provided limited relief of left buttock pain.  After review of imaging and symptoms at her last office visit she was scheduled for a caudal epidural steroid injection.  Manages her pain with occasional Tylenol.  She presents at today's office visit for follow-up after caudal epidural steroid injection on 5/9/2024.  Patient states she received between 50 and 75% relief from her recent caudal epidural steroid injection.  She is continuing to receive relief of radicular symptoms in her left lower extremity.  She states that she currently has low back pain which may radiate into her bilateral hips and occasionally her right thigh, however, she has no pain in her left lower extremity.  She denies numbness/tingling or weakness in her lower extremities.  She denies any changes in bowel/bladder function.  Pain can be aching and throbbing.  Pain can increase with standing and walking.  She has been able to significantly her activity level and exercise regimen after most recent injection secondary to improvement in pain.  She is pleased with the results of her recent injection and feels that she continuing to receive significant relief.   She would like to consider repeating this injection prior to any planned trip to [place] in September.  Continues to manage her pain with occasional Tylenol.    Patient presents to office for interval follow up. Patient is seen for lower back pain that radiates into the bilateral hips. Right thigh pain. Pain is increased with walking. Radicular pain in left leg has resolved with injection.    OSWESTRY SCORE 22    Pain Score: 3/10    Other Medications/Neuromodulators: Tylenol    Injections/Procedures: bilateral SI 03/14/2024 with 25% relief  5/9/24 [id number]% relief     Other:  stretching daily  OARRS:  No data recorded  I have personally reviewed the OARRS report for Luba Humphreys. I have considered the risks of abuse, dependence, addiction and diversion    Is the patient prescribed a combination of a benzodiazepine and opioid?  No    Last Urine Drug Screen / ordered today: No  No results found for this or any previous visit (from the past 8760 hour(s)).  N/A    Controlled Substance Agreement:  Date of the Last Agreement:       Monitoring and compliance:    ORT:    PDUQ:    Office Agreement:      Review of Systems    ROS:   General: No fevers, chills, weight loss  Skin: Negative for lesions  Eyes: No acute vision changes  Ears: No vertigo  Nose, mouth, throat: No difficulty swallowing or speaking  Respiratory: No cough, shortness of breath, cyanosis  Cardiovascular: Negative for chest pain syncope or palpitation  Gastrointestinal: No constipation, nausea, vomiting  Neurological: Negative for headache,   Psychological: Negative for severe or debilitating anxiety, depression. Negative memory loss  Musculoskeletal: Positive for arthralgia, myalgia and pain  Endocrine: Negative for weight gain, appetite changes, excessive sweating  Allergy/immune: Negative    All 13 systems were reviewed and are within normal levels except as noted or in the history of present illness.  Positive or pertinent negative responses are  noted or were in the history of present illness. As noted, the patient denies significant or impairing weakness in the bilateral upper and lower extremities, medication induced constipation, and bowel or bladder incontinence.     Current Outpatient Medications:     atorvastatin (Lipitor) 40 mg tablet, Take 1 tablet (40 mg) by mouth once daily., Disp: , Rfl:     FLUoxetine (PROzac) 40 mg capsule, Take 1 capsule (40 mg) by mouth once daily., Disp: , Rfl:     hydroCHLOROthiazide (HYDRODiuril) 25 mg tablet, Take 2 tablets (50 mg) by mouth once daily., Disp: , Rfl:     losartan-hydrochlorothiazide (Hyzaar) 50-12.5 mg tablet, Take 1 tablet by mouth once daily., Disp: , Rfl:      Past Medical History:   Diagnosis Date    Chronic back pain     Personal history of other diseases of the circulatory system     History of hypertension    Personal history of other endocrine, nutritional and metabolic disease     History of hyperlipidemia        Past Surgical History:   Procedure Laterality Date    BREAST CYST ASPIRATION Bilateral     OTHER SURGICAL HISTORY  2020     section    OTHER SURGICAL HISTORY  2020    Appendectomy    OTHER SURGICAL HISTORY  2020    Back surgery    OTHER SURGICAL HISTORY  2020    Hand surgery    OTHER SURGICAL HISTORY  2022    Cystoscopy        Family History   Problem Relation Name Age of Onset    Throat cancer Maternal Grandmother          Allergies   Allergen Reactions    Ibuprofen Hives and Itching        Objective     Visit Vitals  LMP  (LMP Unknown)   OB Status Postmenopausal   Smoking Status Never        Physical Exam    PE:  General: Well-developed, well-nourished, no acute distress. The patient demonstrates no pain behavior, symptom magnification or overt drug-seeking behavior.  Eye: Pupils appropriate for room lighting  Neck/thyroid: No obvious goiter or enlargement of neck noted  Respiratory exam: Normal respiratory effort, unlabored respiration. No  accessory muscle use noted  Cardiac exam: Bilateral radial pulses intact  Abdominal: Nondistended  Spine, lumbar: The patient is able to rise from a seated to standing position without hesitancy, push off, or delay. Gait is grossly nonantalgic. Tenderness to paraspinous musculature is noted lumbar spine.  Flexion and extension intact.  Extension exacerbates pain to low back.  Negative straight leg raise.  Negative SI joint tenderness.  Neurologic exam: Muscle strength is antigravity in all 4 extremities.  Equal muscle strength bilateral lower extremities 5/5.  Psychiatric exam: Judgment and insight normal, affect normal, speech is fluent, affect appropriate, demonstrating no signs of hypersomnolence, sedation, or confusion          Assessment/Plan   Problem List Items Addressed This Visit             ICD-10-CM    Postlaminectomy syndrome - Primary M96.1     63-year-old female with history of prior L5-S1 microdiscectomy and symptoms consistent with lumbar postlaminectomy syndrome presents for follow-up. Previous review of MRI consistent with degenerative changes worse at L1-2 with some disc desiccation as well as some type II Modic changes at that level with some mild central canal stenosis.  Previously completed formal physical therapy and consistently does home therapy exercises and stretches daily.  SI joint injection provided limited relief for SI pain, although patient did have less tenderness at her SI joint.  She proceeded with a caudal epidural steroid injection for lumbar radicular symptoms most noted in her left lower extremity.  She presents for follow-up at today's visit with complete resolution of lumbar radicular symptoms in her left lower extremity.  She continues to experience some low back pain which may radiate into her bilateral hips and occasionally her right thigh.  She currently feels that her pain is tolerable after her recent injection.  She has endorsed being able to be more active and return  to routine exercise regimen.  She would like to consider repeating her caudal epidural steroid injection prior to a planned trip to [place].  She is expecting to do a great deal of walking on her vacation and feels that the caudal epidural steroid injection would be beneficial.  We will schedule the patient to follow-up in our office in late July for reevaluation of current symptoms.  If at that time she has return of radicular symptoms we will schedule her to repeat her caudal epidural steroid injection.  If she continues to receive relief from her current epidural steroid injection she may cancel this appointment.  Encouraged her to continue routine exercise and return to her gym.  She will continue to supplement with Tylenol.  We did discuss adding gabapentin for neuropathic/radicular symptoms.  She would like to hold off on adding this medication at this time.  Plan reviewed with patient at today's visit.    -Advised patient that she may repeat her caudal epidural steroid injection every 3 to 4 months as needed.  We will schedule the patient to return to our office in late July for reevaluation of her symptoms.  If lumbar radicular symptoms have worsened and she no longer is receiving relief from her current injection we may schedule her to repeat her caudal epidural steroid injection at that time.    -The patient may benefit from a neuromodulator such as gabapentin in the future if needed.  Currently continues to manage her pain with occasional Tylenol.  -Patient will follow-up in our office in approximately 2 months for reevaluation of symptoms.       Disclaimer: This note was transcribed using an audio transcription device.  As such, minor errors may be present with regard to spelling, punctuation, and inadvertent word insertion.  Please disregard such errors.         Lumbar neuritis M54.16    Sacroiliitis (CMS-[facility]) M46.1

## 2024-05-30 NOTE — ASSESSMENT & PLAN NOTE
63-year-old female with history of prior L5-S1 microdiscectomy and symptoms consistent with lumbar postlaminectomy syndrome presents for follow-up. Previous review of MRI consistent with degenerative changes worse at L1-2 with some disc desiccation as well as some type II Modic changes at that level with some mild central canal stenosis.  Previously completed formal physical therapy and consistently does home therapy exercises and stretches daily.  SI joint injection provided limited relief for SI pain, although patient did have less tenderness at her SI joint.  She proceeded with a caudal epidural steroid injection for lumbar radicular symptoms most noted in her left lower extremity.  She presents for follow-up at today's visit with complete resolution of lumbar radicular symptoms in her left lower extremity.  She continues to experience some low back pain which may radiate into her bilateral hips and occasionally her right thigh.  She currently feels that her pain is tolerable after her recent injection.  She has endorsed being able to be more active and return to routine exercise regimen.  She would like to consider repeating her caudal epidural steroid injection prior to a planned trip to [place].  She is expecting to do a great deal of walking on her vacation and feels that the caudal epidural steroid injection would be beneficial.  We will schedule the patient to follow-up in our office in late July for reevaluation of current symptoms.  If at that time she has return of radicular symptoms we will schedule her to repeat her caudal epidural steroid injection.  If she continues to receive relief from her current epidural steroid injection she may cancel this appointment.  Encouraged her to continue routine exercise and return to her gym.  She will continue to supplement with Tylenol.  We did discuss adding gabapentin for neuropathic/radicular symptoms.  She would like to hold off on adding this medication at this  time.  Plan reviewed with patient at today's visit.    -Advised patient that she may repeat her caudal epidural steroid injection every 3 to 4 months as needed.  We will schedule the patient to return to our office in late July for reevaluation of her symptoms.  If lumbar radicular symptoms have worsened and she no longer is receiving relief from her current injection we may schedule her to repeat her caudal epidural steroid injection at that time.    -The patient may benefit from a neuromodulator such as gabapentin in the future if needed.  Currently continues to manage her pain with occasional Tylenol.  -Patient will follow-up in our office in approximately 2 months for reevaluation of symptoms.       Disclaimer: This note was transcribed using an audio transcription device.  As such, minor errors may be present with regard to spelling, punctuation, and inadvertent word insertion.  Please disregard such errors.

## 2024-07-17 ENCOUNTER — OFFICE VISIT (OUTPATIENT)
Dept: PAIN MEDICINE | Facility: HOSPITAL | Age: 64
End: 2024-07-17
Payer: COMMERCIAL

## 2024-07-17 VITALS
HEIGHT: 66 IN | HEART RATE: 60 BPM | DIASTOLIC BLOOD PRESSURE: 66 MMHG | SYSTOLIC BLOOD PRESSURE: 107 MMHG | WEIGHT: 140 LBS | OXYGEN SATURATION: 98 % | BODY MASS INDEX: 22.5 KG/M2 | RESPIRATION RATE: 16 BRPM

## 2024-07-17 DIAGNOSIS — M54.16 LUMBAR NEURITIS: Primary | ICD-10-CM

## 2024-07-17 DIAGNOSIS — M46.1 SACROILIITIS (CMS-HCC): ICD-10-CM

## 2024-07-17 DIAGNOSIS — M96.1 POSTLAMINECTOMY SYNDROME: ICD-10-CM

## 2024-07-17 PROCEDURE — 1159F MED LIST DOCD IN RCRD: CPT | Performed by: CLINICAL NURSE SPECIALIST

## 2024-07-17 PROCEDURE — 3008F BODY MASS INDEX DOCD: CPT | Performed by: CLINICAL NURSE SPECIALIST

## 2024-07-17 PROCEDURE — 1160F RVW MEDS BY RX/DR IN RCRD: CPT | Performed by: CLINICAL NURSE SPECIALIST

## 2024-07-17 PROCEDURE — 99214 OFFICE O/P EST MOD 30 MIN: CPT | Performed by: CLINICAL NURSE SPECIALIST

## 2024-07-17 PROCEDURE — 1036F TOBACCO NON-USER: CPT | Performed by: CLINICAL NURSE SPECIALIST

## 2024-07-17 RX ORDER — DIAZEPAM 5 MG/1
5 TABLET ORAL ONCE AS NEEDED
OUTPATIENT
Start: 2024-07-17

## 2024-07-17 ASSESSMENT — PATIENT HEALTH QUESTIONNAIRE - PHQ9
SUM OF ALL RESPONSES TO PHQ9 QUESTIONS 1 AND 2: 0
1. LITTLE INTEREST OR PLEASURE IN DOING THINGS: NOT AT ALL
2. FEELING DOWN, DEPRESSED OR HOPELESS: NOT AT ALL

## 2024-07-17 ASSESSMENT — ENCOUNTER SYMPTOMS
LOSS OF SENSATION IN FEET: 0
DEPRESSION: 0
OCCASIONAL FEELINGS OF UNSTEADINESS: 0

## 2024-07-17 NOTE — PROGRESS NOTES
Subjective   Patient ID: Luba Humphreys is a 64 y.o. female who presents for lumbar radicular pain    HPI    64-year-old female presents with chronic low back pain which radiates into her lower extremities as well as SI joint pain.  She has a history of an L5-S1 microdiscectomy about 15 years ago.  Previously noted low back pain with radiation to her left buttock and left lower extremity, however pain can radiate to both lower extremities.  She completed a formal course of physical therapy and continues to consistently do home therapy exercises and stretches.  Lumbar MRI consistent with degenerative changes worse at L1-2 with some disc desiccation as well as some type II Modic changes at that level with mild central canal stenosis; no high-grade foraminal or canal stenosis. Patient felt that previous bilateral SI joint injections provided limited relief of SI pain.  She proceeded with a caudal epidural steroid injection which did provide significant/sustained relief of lumbar radicular symptoms.  She had complete relief of radicular symptoms in her left lower extremity.  She was able to be more active and functional with less discomfort after her recent injection.  She presents at today's office visit for routine follow-up.  Currently experiencing low back pain which radiates to bilateral hips and can radiate into bilateral thighs right greater than left.  She denies any numbness/tingling, weakness or changes in bowel/bladder function.  Pain is aching and throbbing.  Pain increases with standing for prolonged period of time and walking.  She feel that she continues to receive relief from her recent caudal epidural steroid injection, however, she is noting radicular symptoms returning to bilateral lower extremities.  She would like to discuss repeating this injection as she received excellent relief with her most recent injection and she is planning a vacation to [place] where she will be walking/hiking a majority of  the time.  Continues to manage her pain with minimal medication.  She will occasionally take Tylenol when needed.  Previously discussed the addition of gabapentin.  Patient would like to hold off on additional medication at this time.    Patient presents to office for interval follow up. Patient is seen for lower back pain that radiates into the bilateral hips and intermittently down the anterior left leg stopping at the midthigh. Pain is increased with walking. Patient presents to office today following up post caudal performed on 05/09/2024 and reports significant relief to the left side that is beginning to return.      Pain Score: 5/10    Other Medications/Neuromodulators: none     Injections/Procedures: bilateral SI 03/14/2024 with 25% relief, 05/09/2024 caudal with significant relief     Other:  denies    OARRS:  Kimmy Espino, APRN-CNP, APRN-CNS on 7/17/2024  2:03 PM  I have personally reviewed the OARRS report for Luba Humphreys. I have considered the risks of abuse, dependence, addiction and diversion    Is the patient prescribed a combination of a benzodiazepine and opioid?  No    Last Urine Drug Screen / ordered today: No n/a  No results found for this or any previous visit (from the past 8760 hour(s)).  N/A    Controlled Substance Agreement:  Date of the Last Agreement:  n/a      Monitoring and compliance:    ORT: n/a    PDUQ: n/a    Office Agreement: 03/04/2024      Review of Systems    ROS:   General: No fevers, chills, weight loss  Skin: Negative for lesions  Eyes: No acute vision changes  Ears: No vertigo  Nose, mouth, throat: No difficulty swallowing or speaking  Respiratory: No cough, shortness of breath, cyanosis  Cardiovascular: Negative for chest pain syncope or palpitation  Gastrointestinal: No constipation, nausea, vomiting  Neurological: Negative for headache,   Psychological: Negative for severe or debilitating anxiety, depression. Negative memory loss  Musculoskeletal: Positive for  "arthralgia, myalgia and pain  Endocrine: Negative for weight gain, appetite changes, excessive sweating  Allergy/immune: Negative    All 13 systems were reviewed and are within normal levels except as noted or in the history of present illness.  Positive or pertinent negative responses are noted or were in the history of present illness. As noted, the patient denies significant or impairing weakness in the bilateral upper and lower extremities, medication induced constipation, and bowel or bladder incontinence.     Current Outpatient Medications:     atorvastatin (Lipitor) 40 mg tablet, Take 1 tablet (40 mg) by mouth once daily., Disp: , Rfl:     FLUoxetine (PROzac) 40 mg capsule, Take 1 capsule (40 mg) by mouth once daily., Disp: , Rfl:     losartan-hydrochlorothiazide (Hyzaar) 50-12.5 mg tablet, Take 1 tablet by mouth once daily., Disp: , Rfl:      Past Medical History:   Diagnosis Date    Chronic back pain     Personal history of other diseases of the circulatory system     History of hypertension    Personal history of other endocrine, nutritional and metabolic disease     History of hyperlipidemia        Past Surgical History:   Procedure Laterality Date    BREAST CYST ASPIRATION Bilateral     OTHER SURGICAL HISTORY  2020     section    OTHER SURGICAL HISTORY  2020    Appendectomy    OTHER SURGICAL HISTORY  2020    Back surgery    OTHER SURGICAL HISTORY  2020    Hand surgery    OTHER SURGICAL HISTORY  2022    Cystoscopy        Family History   Problem Relation Name Age of Onset    Throat cancer Maternal Grandmother          Allergies   Allergen Reactions    Ibuprofen Hives and Itching        Objective     Visit Vitals  /66   Pulse 60   Resp 16   Ht 1.676 m (5' 6\")   Wt 63.5 kg (140 lb)   LMP  (LMP Unknown)   SpO2 98%   BMI 22.60 kg/m²   OB Status Postmenopausal   Smoking Status Never   BSA 1.72 m²        Physical Exam    PE:  General: Well-developed, well-nourished, " no acute distress. The patient demonstrates no pain behavior, symptom magnification or overt drug-seeking behavior.  Eye: Pupils appropriate for room lighting  Neck/thyroid: No obvious goiter or enlargement of neck noted  Respiratory exam: Normal respiratory effort, unlabored respiration. No accessory muscle use noted  Cardiac exam: Bilateral radial pulses intact  Abdominal: Nondistended  Spine, lumbar: The patient is able to rise from a seated to standing position without hesitancy, push off, or delay. Gait is grossly nonantalgic. Tenderness to paraspinous musculature is noted lower lumbar spine.  Flexion and extension intact with both increasing pain to low back.  Negative straight leg raise.  Negative for SI joint tenderness bilaterally.  Negative Ijeoma, Gaenslen's and sacral thrust bilaterally.  Neurologic exam: Muscle strength is antigravity in all 4 extremities.  Equal muscle strength bilateral lower extremities 5/5.  Psychiatric exam: Judgment and insight normal, affect normal, speech is fluent, affect appropriate, demonstrating no signs of hypersomnolence, sedation, or confusion          Assessment/Plan   Problem List Items Addressed This Visit             ICD-10-CM    Postlaminectomy syndrome M96.1    Lumbar neuritis - Primary M54.16     64-year-old female with history of prior L5-S1 microdiscectomy presents for follow-up.  Previous review of MRI consistent with degenerative changes worse at L1-2 with some disc desiccation as well as some type II Modic changes at that level with some mild central canal stenosis.  She has completed formal physical therapy and consistently does home therapy exercises and stretches daily.  Patient felt that her SI joint injections provided limited relief, however, she continues to have minimal tenderness over SI joints with negative provocative maneuvers.  She did very well with her recent caudal epidural steroid injection on 5/9/2024 which provided significant relief of  left-sided radicular symptoms.  Currently she is experiencing return of lumbar radicular symptoms with pain radiating into her bilateral hips and bilateral thighs right greater than left. Patient's symptoms and physical exam consistent with a lumbar neuritis.  Discussed that she may benefit from repeating her caudal epidural steroid injection especially since she is planning a trip to [place] where she will be hiking and walking a great distance.  Reviewed risks and benefits of a caudal epidural steroid injection and patient would like to proceed with repeating this injection.  We also once again discussed the addition of a neuromodulator to target neuropathic component of pain such as gabapentin.  At this time the patient would like to continue injections and consistent exercise.  If she fails to receive relief with these interventions she will consider adding gabapentin.  Plan reviewed with patient at today's visit.    -Scheduled for caudal epidural steroid injection under fluoroscopic guidance targeting symptoms of lumbar neuritis.  CPT 14010.  -The patient may benefit from a neuromodulator such as gabapentin in the future if needed.  Currently continues to manage her pain with occasional Tylenol.  -Patient will follow-up in our office approximately 4 weeks after her injection for reevaluation.     Disclaimer: This note was transcribed using an audio transcription device.  As such, minor errors may be present with regard to spelling, punctuation, and inadvertent word insertion.  Please disregard such errors.           Relevant Orders    [place] pain management    Epidural Steroid Injection    Sacroiliitis (CMS-[facility]) M46.1

## 2024-07-17 NOTE — ASSESSMENT & PLAN NOTE
64-year-old female with history of prior L5-S1 microdiscectomy presents for follow-up.  Previous review of MRI consistent with degenerative changes worse at L1-2 with some disc desiccation as well as some type II Modic changes at that level with some mild central canal stenosis.  She has completed formal physical therapy and consistently does home therapy exercises and stretches daily.  Patient felt that her SI joint injections provided limited relief, however, she continues to have minimal tenderness over SI joints with negative provocative maneuvers.  She did very well with her recent caudal epidural steroid injection on 5/9/2024 which provided significant relief of left-sided radicular symptoms.  Currently she is experiencing return of lumbar radicular symptoms with pain radiating into her bilateral hips and bilateral thighs right greater than left. Patient's symptoms and physical exam consistent with a lumbar neuritis.  Discussed that she may benefit from repeating her caudal epidural steroid injection especially since she is planning a trip to [place] where she will be hiking and walking a great distance.  Reviewed risks and benefits of a caudal epidural steroid injection and patient would like to proceed with repeating this injection.  We also once again discussed the addition of a neuromodulator to target neuropathic component of pain such as gabapentin.  At this time the patient would like to continue injections and consistent exercise.  If she fails to receive relief with these interventions she will consider adding gabapentin.  Plan reviewed with patient at today's visit.    -Scheduled for caudal epidural steroid injection under fluoroscopic guidance targeting symptoms of lumbar neuritis.  CPT 37079.  -The patient may benefit from a neuromodulator such as gabapentin in the future if needed.  Currently continues to manage her pain with occasional Tylenol.  -Patient will follow-up in our office approximately 4  weeks after her injection for reevaluation.     Disclaimer: This note was transcribed using an audio transcription device.  As such, minor errors may be present with regard to spelling, punctuation, and inadvertent word insertion.  Please disregard such errors.

## 2024-08-23 ENCOUNTER — HOSPITAL ENCOUNTER (OUTPATIENT)
Dept: GASTROENTEROLOGY | Facility: HOSPITAL | Age: 64
Discharge: HOME | End: 2024-08-23
Payer: COMMERCIAL

## 2024-08-23 VITALS
TEMPERATURE: 98.3 F | OXYGEN SATURATION: 97 % | RESPIRATION RATE: 16 BRPM | BODY MASS INDEX: 22.18 KG/M2 | HEART RATE: 54 BPM | DIASTOLIC BLOOD PRESSURE: 70 MMHG | HEIGHT: 66 IN | WEIGHT: 138 LBS | SYSTOLIC BLOOD PRESSURE: 147 MMHG

## 2024-08-23 DIAGNOSIS — M54.16 LUMBAR NEURITIS: ICD-10-CM

## 2024-08-23 PROCEDURE — 2500000005 HC RX 250 GENERAL PHARMACY W/O HCPCS: Performed by: PHYSICAL MEDICINE & REHABILITATION

## 2024-08-23 PROCEDURE — 2500000004 HC RX 250 GENERAL PHARMACY W/ HCPCS (ALT 636 FOR OP/ED): Performed by: PHYSICAL MEDICINE & REHABILITATION

## 2024-08-23 PROCEDURE — 62323 NJX INTERLAMINAR LMBR/SAC: CPT | Performed by: PHYSICAL MEDICINE & REHABILITATION

## 2024-08-23 PROCEDURE — 2550000001 HC RX 255 CONTRASTS: Performed by: PHYSICAL MEDICINE & REHABILITATION

## 2024-08-23 RX ORDER — METHYLPREDNISOLONE ACETATE 40 MG/ML
INJECTION, SUSPENSION INTRA-ARTICULAR; INTRALESIONAL; INTRAMUSCULAR; SOFT TISSUE AS NEEDED
Status: COMPLETED | OUTPATIENT
Start: 2024-08-23 | End: 2024-08-23

## 2024-08-23 RX ORDER — LIDOCAINE HYDROCHLORIDE 5 MG/ML
INJECTION, SOLUTION INFILTRATION; INTRAVENOUS AS NEEDED
Status: COMPLETED | OUTPATIENT
Start: 2024-08-23 | End: 2024-08-23

## 2024-08-23 ASSESSMENT — PAIN - FUNCTIONAL ASSESSMENT
PAIN_FUNCTIONAL_ASSESSMENT: 0-10
PAIN_FUNCTIONAL_ASSESSMENT: 0-10

## 2024-08-23 ASSESSMENT — COLUMBIA-SUICIDE SEVERITY RATING SCALE - C-SSRS: 1. IN THE PAST MONTH, HAVE YOU WISHED YOU WERE DEAD OR WISHED YOU COULD GO TO SLEEP AND NOT WAKE UP?: NO

## 2024-08-23 ASSESSMENT — PAIN SCALES - GENERAL
PAINLEVEL_OUTOF10: 1
PAINLEVEL_OUTOF10: 4

## 2024-08-23 NOTE — DISCHARGE INSTRUCTIONS
You had a pain management procedure today.    Observe/ monitor for the following signs & symptoms:  If you notice Excessive bleeding (slow general oozing that completely soaks the dressing, or fresh bright red bleeding).   In either case, apply pressure to the area, elevate it if possible & call your doctor at once.    Also observe for:  Change in color  Numbness/tingling  Coldness to the touch  Swelling  Drainage  Temperature of 101.5 or higher.  Increased, uncontrollable pain.    *If you notice the above signs & symptoms, please call your doctor right away!*      Discharge Instructions:    Your pain may not be gone immediately after this procedure; it generally takes 3 to 5 days for the steroid to work.   Keep the needle site clean & dry for 24 hours.  Continue your present medications.  Make an appointment to see your doctor in 2-3 weeks.  If any problems occur, or if you have any further questions, please call as soon as possible. If you find that you cannot reach your doctor, but feel that the condition nees a doctor's attention, go to the closest emergency department & take this discharge paper with you.       Dr. Awad's Office: [phone]

## 2024-08-23 NOTE — H&P
HISTORY AND PHYSICAL    History Of Present Illness  Luba Humphreys is a 64 y.o. female presenting with chronic low back pain here for Caudal epidural steroid injection; she denies any recent antibiotic use or infections, she denies any blood thinner use , and she denies contrast or local anesthetic allergies     Past Medical History  Past Medical History:   Diagnosis Date    Chronic back pain     Personal history of other diseases of the circulatory system     History of hypertension    Personal history of other endocrine, nutritional and metabolic disease     History of hyperlipidemia       Surgical History  Past Surgical History:   Procedure Laterality Date    BREAST CYST ASPIRATION Bilateral     OTHER SURGICAL HISTORY  2020     section    OTHER SURGICAL HISTORY  2020    Appendectomy    OTHER SURGICAL HISTORY  2020    Back surgery    OTHER SURGICAL HISTORY  2020    Hand surgery    OTHER SURGICAL HISTORY  2022    Cystoscopy        Social History  She reports that she has never smoked. She has never used smokeless tobacco. She reports current alcohol use of about 1.0 standard drink of alcohol per week. She reports that she does not use drugs.    Family History  Family History   Problem Relation Name Age of Onset    Throat cancer Maternal Grandmother          Allergies  Ibuprofen    Review of Systems   12 point ROS done and negative except for the above.   Physical Exam     General: NAD, well groomed, well nourished  Eyes: Non-icteric sclera, EOMI  Ears, Nose, Mouth, and Throat: External ears and nose appear to be without deformity or rash. No lesions or masses noted. Hearing is grossly intact.   Neck: Trachea midline  Respiratory: Nonlabored breathing   Cardiovascular: No peripheral edema   Skin: No rashes or open lesions/ulcers identified on skin.    Last Recorded Vitals  There were no vitals taken for this visit.    Relevant Results           Assessment/Plan       Risks,  benefits, alternatives discussed. All questions answered to the best of my ability. Patient agrees to proceed.   -We will proceed with planned procedure        Tatiana Ron MD  Chronic Pain Fellow  [facility]

## 2024-09-25 ENCOUNTER — OFFICE VISIT (OUTPATIENT)
Dept: PAIN MEDICINE | Facility: HOSPITAL | Age: 64
End: 2024-09-25
Payer: COMMERCIAL

## 2024-09-25 VITALS
WEIGHT: 139.6 LBS | HEART RATE: 65 BPM | HEIGHT: 66 IN | RESPIRATION RATE: 18 BRPM | SYSTOLIC BLOOD PRESSURE: 114 MMHG | OXYGEN SATURATION: 98 % | BODY MASS INDEX: 22.43 KG/M2 | DIASTOLIC BLOOD PRESSURE: 76 MMHG

## 2024-09-25 DIAGNOSIS — M54.16 LUMBAR NEURITIS: ICD-10-CM

## 2024-09-25 DIAGNOSIS — M96.1 POSTLAMINECTOMY SYNDROME: Primary | ICD-10-CM

## 2024-09-25 PROCEDURE — 1160F RVW MEDS BY RX/DR IN RCRD: CPT | Performed by: CLINICAL NURSE SPECIALIST

## 2024-09-25 PROCEDURE — 1125F AMNT PAIN NOTED PAIN PRSNT: CPT | Performed by: CLINICAL NURSE SPECIALIST

## 2024-09-25 PROCEDURE — 1159F MED LIST DOCD IN RCRD: CPT | Performed by: CLINICAL NURSE SPECIALIST

## 2024-09-25 PROCEDURE — 99214 OFFICE O/P EST MOD 30 MIN: CPT | Performed by: CLINICAL NURSE SPECIALIST

## 2024-09-25 PROCEDURE — 1036F TOBACCO NON-USER: CPT | Performed by: CLINICAL NURSE SPECIALIST

## 2024-09-25 PROCEDURE — 3008F BODY MASS INDEX DOCD: CPT | Performed by: CLINICAL NURSE SPECIALIST

## 2024-09-25 ASSESSMENT — ENCOUNTER SYMPTOMS
LOSS OF SENSATION IN FEET: 0
DEPRESSION: 0
OCCASIONAL FEELINGS OF UNSTEADINESS: 0

## 2024-09-25 ASSESSMENT — PATIENT HEALTH QUESTIONNAIRE - PHQ9: 2. FEELING DOWN, DEPRESSED OR HOPELESS: NOT AT ALL

## 2024-09-25 ASSESSMENT — PAIN SCALES - GENERAL: PAINLEVEL: 2

## 2024-09-25 NOTE — ASSESSMENT & PLAN NOTE
64-year-old female with history of prior L5-S1 microdiscectomy presents for follow-up.  Previous review of MRI consistent with degenerative changes worse at L1-2 with some disc desiccation as well as some type II Modic changes at that level with some mild central canal stenosis.  She has completed formal physical therapy and consistently does home therapy exercises and stretches daily.  Patient felt that her SI joint injections provided limited relief, however, she continues to have minimal tenderness over SI joints with negative provocative maneuvers.  She has done well with previous caudal SHRUTHI and presents today for follow up after repeating this injection.  Patient endorsed greater than 70% relief with this injection.  She has no radicular pain radiating to her left lower extremity and minimal radicular symptoms in her right lower extremity.  She states she was able to proceed with her vacation and walk further distance, stand longer with less discomfort.  She feels that she continues to receive relief from this injection.  Continues to do home therapy stretches daily.  She is planning to restart her exercise regimen now that her pain is better controlled.  She is considering adding water therapy.  Manages her pain with Tylenol.  Not interested in additional medication at this time.  She may benefit from the addition of gabapentin in the future.  Plan reviewed with patient at today's visit.    -Advised patient that she may repeat her caudal epidural steroid injection every 3 to 4 months as needed.  Currently she continues to receive significant relief with her most recent injection.    -The patient may benefit from the addition of a neuromodulator such as gabapentin in the future if symptoms should worsen.  She would like to hold off on additional medication at this time.  She will continue Tylenol as needed.  Advised the patient to keep total Tylenol dose less than 3000 mg in 24 hours.    -Patient will follow-up  in our office as needed.       Disclaimer: This note was transcribed using an audio transcription device.  As such, minor errors may be present with regard to spelling, punctuation, and inadvertent word insertion.  Please disregard such errors.

## 2024-09-25 NOTE — PROGRESS NOTES
Follow Up Visit    Location of Pain: Follow up injection 8/23/24. Low back and bilat hips, front top of right thigh. Left sciatic area. Occasionally still has pain top of right thigh and lower back with too much movement.  States pain has improved with recent injection         Pain Score: 2/10    Other pain medication/neuromodulator: tylenol prn    Therapeutic Goals: start exercising again    Injections and/or Procedures: SHRUTHI 5/9/24, 8/23/24    Other: PT 6 weeks-feb 2024-did not help, ices back every AM  OA 3/4/24    Subjective   Patient ID: Luba Humphreys is a 64 y.o. female who presents for lumbar radicular pain    HPI  64-year-old female presents with chronic low back pain which radiates into her lower extremities as well as SI joint pain.  She has a history of an L5-S1 microdiscectomy about 15 years ago.  Previously noted low back pain with radiation to her left buttock and left lower extremity, however pain can radiate to both lower extremities.  She completed a formal course of physical therapy and continues to consistently do home therapy exercises and stretches.  Lumbar MRI consistent with degenerative changes worse at L1-2 with some disc desiccation as well as some type II Modic changes at that level with mild central canal stenosis; no high-grade foraminal or canal stenosis. Patient felt that previous bilateral SI joint injections provided limited relief of SI pain.  She proceeded with a caudal epidural steroid injection which did provide significant/sustained relief of lumbar radicular symptoms.  She had complete relief of radicular symptoms in her left lower extremity.  She was able to be more active and functional with less discomfort after her recent injection.  She noted an increase in pain radiating to her right lower extremity her last office visit and wanted to repeat her caudal epidural steroid injection.  Managing her pain with occasional Tylenol.  Previously discussed adding gabapentin  which the patient wanted to hold off on additional medication at this time.  And at today's office visit for follow-up after recent caudal epidural steroid injection on 8/23/2024.  Patient endorsing greater than 70% relief with this injection.  She states that she has no radicular pain on the left and minimal radicular pain radiating to her right lower extremity.  She was able to travel to [place] on a train after her recent injection with minimal discomfort.  She was also able to walk further and stand longer with less pain.  She was able to enjoy all activities during her vacation with little discomfort.  Continuing to receive relief from her most recent injection.  She denies any numbness/tingling in her lower legs and denies any weakness.  No changes in bowel/bladder function.  Continues to stretch daily and is planning to resume a formal exercise regimen now that her pain has improved.  Managing her pain with occasional Tylenol.      OARRS:  No data recorded  I have personally reviewed the OARRS report for Luba Humphreys. I have considered the risks of abuse, dependence, addiction and diversion    Is the patient prescribed a combination of a benzodiazepine and opioid?  No    Last Urine Drug Screen / ordered today: No  No results found for this or any previous visit (from the past 8760 hour(s)).  N/A    Controlled Substance Agreement:  Date of the Last Agreement:       Monitoring and compliance:    ORT:    PDUQ:    Office Agreement: 3/4/24      Review of Systems    ROS:   General: No fevers, chills, weight loss  Skin: Negative for lesions  Eyes: No acute vision changes  Ears: No vertigo  Nose, mouth, throat: No difficulty swallowing or speaking  Respiratory: No cough, shortness of breath, cyanosis  Cardiovascular: Negative for chest pain syncope or palpitation  Gastrointestinal: No constipation, nausea, vomiting  Neurological: Negative for headache,   Psychological: Negative for severe or debilitating anxiety,  depression. Negative memory loss  Musculoskeletal: Positive for arthralgia, myalgia and pain  Endocrine: Negative for weight gain, appetite changes, excessive sweating  Allergy/immune: Negative    All 13 systems were reviewed and are within normal levels except as noted or in the history of present illness.  Positive or pertinent negative responses are noted or were in the history of present illness. As noted, the patient denies significant or impairing weakness in the bilateral upper and lower extremities, medication induced constipation, and bowel or bladder incontinence.     Current Outpatient Medications:     atorvastatin (Lipitor) 40 mg tablet, Take 1 tablet (40 mg) by mouth once daily., Disp: , Rfl:     FLUoxetine (PROzac) 40 mg capsule, Take 1 capsule (40 mg) by mouth once daily., Disp: , Rfl:     losartan-hydrochlorothiazide (Hyzaar) 50-12.5 mg tablet, Take 1 tablet by mouth once daily., Disp: , Rfl:      Past Medical History:   Diagnosis Date    Chronic back pain     Personal history of other diseases of the circulatory system     History of hypertension    Personal history of other endocrine, nutritional and metabolic disease     History of hyperlipidemia        Past Surgical History:   Procedure Laterality Date    BREAST CYST ASPIRATION Bilateral     OTHER SURGICAL HISTORY  2020     section    OTHER SURGICAL HISTORY  2020    Appendectomy    OTHER SURGICAL HISTORY  2020    Back surgery    OTHER SURGICAL HISTORY  2020    Hand surgery    OTHER SURGICAL HISTORY  2022    Cystoscopy        Family History   Problem Relation Name Age of Onset    Throat cancer Maternal Grandmother          Allergies   Allergen Reactions    Ibuprofen Hives and Itching        Objective     Visit Vitals  LMP  (LMP Unknown)   OB Status Postmenopausal   Smoking Status Never        Physical Exam    PE:  General: Well-developed, well-nourished, no acute distress. The patient demonstrates no pain  behavior, symptom magnification or overt drug-seeking behavior.  Eye: Pupils appropriate for room lighting  Neck/thyroid: No obvious goiter or enlargement of neck noted  Respiratory exam: Normal respiratory effort, unlabored respiration. No accessory muscle use noted  Cardiac exam: Bilateral radial pulses intact  Abdominal: Nondistended  Spine, lumbar: The patient is able to rise from a seated to standing position without hesitancy, push off, or delay. Gait is grossly nonantalgic.  Mild tenderness to paraspinous musculature is noted lower lumbar region.  Flexion and extension intact.  Negative straight leg raise.  Negative SI joint tenderness.  Neurologic exam: Muscle strength is antigravity in all 4 extremities.  Equal muscle strength bilateral lower extremities 5/5.  Psychiatric exam: Judgment and insight normal, affect normal, speech is fluent, affect appropriate, demonstrating no signs of hypersomnolence, sedation, or confusion          Assessment/Plan   Problem List Items Addressed This Visit             ICD-10-CM    Postlaminectomy syndrome - Primary M96.1    Lumbar neuritis M54.16     64-year-old female with history of prior L5-S1 microdiscectomy presents for follow-up.  Previous review of MRI consistent with degenerative changes worse at L1-2 with some disc desiccation as well as some type II Modic changes at that level with some mild central canal stenosis.  She has completed formal physical therapy and consistently does home therapy exercises and stretches daily.  Patient felt that her SI joint injections provided limited relief, however, she continues to have minimal tenderness over SI joints with negative provocative maneuvers.  She has done well with previous caudal SHRUTHI and presents today for follow up after repeating this injection.  Patient endorsed greater than 70% relief with this injection.  She has no radicular pain radiating to her left lower extremity and minimal radicular symptoms in her right  lower extremity.  She states she was able to proceed with her vacation and walk further distance, stand longer with less discomfort.  She feels that she continues to receive relief from this injection.  Continues to do home therapy stretches daily.  She is planning to restart her exercise regimen now that her pain is better controlled.  She is considering adding water therapy.  Manages her pain with Tylenol.  Not interested in additional medication at this time.  She may benefit from the addition of gabapentin in the future.  Plan reviewed with patient at today's visit.    -Advised patient that she may repeat her caudal epidural steroid injection every 3 to 4 months as needed.  Currently she continues to receive significant relief with her most recent injection.    -The patient may benefit from the addition of a neuromodulator such as gabapentin in the future if symptoms should worsen.  She would like to hold off on additional medication at this time.  She will continue Tylenol as needed.  Advised the patient to keep total Tylenol dose less than 3000 mg in 24 hours.    -Patient will follow-up in our office as needed.       Disclaimer: This note was transcribed using an audio transcription device.  As such, minor errors may be present with regard to spelling, punctuation, and inadvertent word insertion.  Please disregard such errors.

## 2024-09-30 ENCOUNTER — TELEPHONE (OUTPATIENT)
Dept: OBSTETRICS AND GYNECOLOGY | Facility: CLINIC | Age: 64
End: 2024-09-30
Payer: COMMERCIAL

## 2024-09-30 DIAGNOSIS — Z12.31 SCREENING MAMMOGRAM FOR BREAST CANCER: ICD-10-CM

## 2024-09-30 NOTE — TELEPHONE ENCOUNTER
Patient called asking for a mammogram order to be placed. Patient scheduled for an Annual 10/29/25

## 2024-10-07 ENCOUNTER — PREP FOR PROCEDURE (OUTPATIENT)
Dept: PAIN MEDICINE | Facility: HOSPITAL | Age: 64
End: 2024-10-07
Payer: COMMERCIAL

## 2024-10-07 ENCOUNTER — TELEPHONE (OUTPATIENT)
Dept: PAIN MEDICINE | Facility: HOSPITAL | Age: 64
End: 2024-10-07
Payer: COMMERCIAL

## 2024-10-07 DIAGNOSIS — M54.16 LUMBAR NEURITIS: ICD-10-CM

## 2024-10-07 DIAGNOSIS — M96.1 POSTLAMINECTOMY SYNDROME: Primary | ICD-10-CM

## 2024-10-07 RX ORDER — DIAZEPAM 5 MG/1
5 TABLET ORAL ONCE AS NEEDED
OUTPATIENT
Start: 2024-10-07

## 2024-10-07 NOTE — TELEPHONE ENCOUNTER
Patient calling would like to repeat Caudal Epidural Steroid Injection w/image that was done 08/23/24.. order would need to be put in to schedule injection

## 2024-11-04 ENCOUNTER — HOSPITAL ENCOUNTER (OUTPATIENT)
Dept: RADIOLOGY | Facility: CLINIC | Age: 64
Discharge: HOME | End: 2024-11-04
Payer: COMMERCIAL

## 2024-11-04 VITALS — WEIGHT: 138 LBS | HEIGHT: 66 IN | BODY MASS INDEX: 22.18 KG/M2

## 2024-11-04 DIAGNOSIS — Z12.31 ENCOUNTER FOR SCREENING MAMMOGRAM FOR MALIGNANT NEOPLASM OF BREAST: ICD-10-CM

## 2024-11-04 PROCEDURE — 77067 SCR MAMMO BI INCL CAD: CPT

## 2024-11-04 PROCEDURE — 77067 SCR MAMMO BI INCL CAD: CPT | Performed by: RADIOLOGY

## 2024-11-04 PROCEDURE — 77063 BREAST TOMOSYNTHESIS BI: CPT | Performed by: RADIOLOGY

## 2024-12-02 ENCOUNTER — TELEPHONE (OUTPATIENT)
Dept: OBSTETRICS AND GYNECOLOGY | Facility: CLINIC | Age: 64
End: 2024-12-02
Payer: COMMERCIAL

## 2024-12-02 NOTE — TELEPHONE ENCOUNTER
Patient called asking for the results of the last mammogram along with the script sent to [facility]. Please fax to [phone]. This will allow her to get the Breast US done. Any questions, please call patient. Thank you

## 2024-12-03 NOTE — TELEPHONE ENCOUNTER
Per her last mammogram done in 2024 it notes the asymmetrical areas but notes them as benign findings and does not reccomed any further follow up.  Please verify if she wants this on her own desire, or if radiology suggested this?  I can order a breast ultrasound either way, but insurance coverage (if patient is concerned about that) could be a problem.

## 2024-12-03 NOTE — TELEPHONE ENCOUNTER
Discussed the following with patient, she states if they recommend a 1 year follow up mammogram she prefers to just wait until next year and not go through with breast ultrasound.

## 2024-12-06 ENCOUNTER — HOSPITAL ENCOUNTER (OUTPATIENT)
Dept: GASTROENTEROLOGY | Facility: HOSPITAL | Age: 64
Discharge: HOME | End: 2024-12-06
Payer: COMMERCIAL

## 2024-12-06 VITALS
RESPIRATION RATE: 16 BRPM | DIASTOLIC BLOOD PRESSURE: 64 MMHG | OXYGEN SATURATION: 95 % | HEART RATE: 70 BPM | SYSTOLIC BLOOD PRESSURE: 122 MMHG | TEMPERATURE: 98 F

## 2024-12-06 DIAGNOSIS — M96.1 POSTLAMINECTOMY SYNDROME: ICD-10-CM

## 2024-12-06 DIAGNOSIS — M54.16 LUMBAR NEURITIS: ICD-10-CM

## 2024-12-06 PROCEDURE — 2550000001 HC RX 255 CONTRASTS: Performed by: PHYSICAL MEDICINE & REHABILITATION

## 2024-12-06 PROCEDURE — 2500000004 HC RX 250 GENERAL PHARMACY W/ HCPCS (ALT 636 FOR OP/ED): Performed by: PHYSICAL MEDICINE & REHABILITATION

## 2024-12-06 PROCEDURE — 62323 NJX INTERLAMINAR LMBR/SAC: CPT | Performed by: PHYSICAL MEDICINE & REHABILITATION

## 2024-12-06 RX ORDER — LIDOCAINE HYDROCHLORIDE 5 MG/ML
INJECTION, SOLUTION INFILTRATION; PERINEURAL AS NEEDED
Status: COMPLETED | OUTPATIENT
Start: 2024-12-06 | End: 2024-12-06

## 2024-12-06 RX ORDER — METHYLPREDNISOLONE ACETATE 40 MG/ML
INJECTION, SUSPENSION INTRA-ARTICULAR; INTRALESIONAL; INTRAMUSCULAR; SOFT TISSUE AS NEEDED
Status: COMPLETED | OUTPATIENT
Start: 2024-12-06 | End: 2024-12-06

## 2024-12-06 ASSESSMENT — PAIN - FUNCTIONAL ASSESSMENT
PAIN_FUNCTIONAL_ASSESSMENT: 0-10
PAIN_FUNCTIONAL_ASSESSMENT: 0-10

## 2024-12-06 ASSESSMENT — PAIN SCALES - GENERAL
PAINLEVEL_OUTOF10: 3
PAINLEVEL_OUTOF10: 6

## 2024-12-06 NOTE — DISCHARGE INSTRUCTIONS
You had a pain management procedure today.    Observe/ monitor for the following signs & symptoms:  If you notice Excessive bleeding (slow general oozing that completely soaks the dressing, or fresh bright red bleeding).   In either case, apply pressure to the area, elevate it if possible & call your doctor at once.    Also observe for:  Change in color  Numbness/tingling  Coldness to the touch  Swelling  Drainage  Temperature of 101.5 or higher.  Increased, uncontrollable pain.    *If you notice the above signs & symptoms, please call your doctor right away!*      Discharge Instructions:    Your pain may not be gone immediately after this procedure; it generally takes 3 to 5 days for the steroid to work.   Keep the needle site clean & dry for 24 hours.  Continue your present medications.  Make an appointment to see your doctor in 2-3 weeks.  If any problems occur, or if you have any further questions, please call as soon as possible. If you find that you cannot reach your doctor, but feel that the condition nees a doctor's attention, go to the closest emergency department & take this discharge paper with you.       Dr. Awad's Office: [phone]

## 2024-12-06 NOTE — H&P
History Of Present Illness  Luba Humphreys is a 64 y.o. femalepresents for procedure state below. Endorses no changes in past medical history or medical health since last seen in clinic.      Past Medical History  Past Medical History:   Diagnosis Date    Chronic back pain     Personal history of other diseases of the circulatory system     History of hypertension    Personal history of other endocrine, nutritional and metabolic disease     History of hyperlipidemia       Surgical History  Past Surgical History:   Procedure Laterality Date    BREAST CYST ASPIRATION Bilateral     OTHER SURGICAL HISTORY  2020     section    OTHER SURGICAL HISTORY  2020    Appendectomy    OTHER SURGICAL HISTORY  2020    Back surgery    OTHER SURGICAL HISTORY  2020    Hand surgery    OTHER SURGICAL HISTORY  2022    Cystoscopy        Social History  She reports that she has never smoked. She has never used smokeless tobacco. She reports current alcohol use of about 1.0 standard drink of alcohol per week. She reports that she does not use drugs.    Family History  Family History   Problem Relation Name Age of Onset    Throat cancer Maternal Grandmother          Allergies  Ibuprofen    Review of Systems   All other systems reviewed and are negative.       Physical Exam  Vitals reviewed.   Constitutional:       General: She is not in acute distress.     Appearance: Normal appearance. She is not ill-appearing.   HENT:      Head: Normocephalic and atraumatic.      Nose: Nose normal.   Eyes:      Extraocular Movements: Extraocular movements intact.      Pupils: Pupils are equal, round, and reactive to light.   Cardiovascular:      Rate and Rhythm: Normal rate and regular rhythm.   Pulmonary:      Effort: Pulmonary effort is normal.      Breath sounds: Normal breath sounds.   Abdominal:      General: Abdomen is flat.      Palpations: Abdomen is soft.   Musculoskeletal:         General: Normal range of  motion.      Cervical back: Normal range of motion.   Skin:     General: Skin is warm.   Neurological:      General: No focal deficit present.      Mental Status: She is alert and oriented to person, place, and time. Mental status is at baseline.   Psychiatric:         Mood and Affect: Mood normal.         Behavior: Behavior normal.          Last Recorded Vitals  Blood pressure 121/69, pulse 71, temperature 36.7 °C (98 °F), temperature source Temporal, resp. rate 16, SpO2 98%.      Assessment/Plan     Luba Humphreys is a 64 y.o. year old female patient    presenting for Caudal Epidural steroid injection.  Patient denies any recent antibiotic use or infections, denies any blood thinner use, and denies contrast or local anesthetic allergies      Risks, benefits, alternatives discussed. All questions answered to the best of my ability. Patient agrees to proceed.      Our plan is as follows:  - Proceed with aforementioned procedure     Ollie Juan MD

## 2024-12-15 ENCOUNTER — APPOINTMENT (OUTPATIENT)
Dept: RADIOLOGY | Facility: HOSPITAL | Age: 64
End: 2024-12-15
Payer: COMMERCIAL

## 2024-12-15 ENCOUNTER — HOSPITAL ENCOUNTER (EMERGENCY)
Facility: HOSPITAL | Age: 64
Discharge: HOME | End: 2024-12-15
Payer: COMMERCIAL

## 2024-12-15 VITALS
RESPIRATION RATE: 18 BRPM | OXYGEN SATURATION: 98 % | HEIGHT: 66 IN | SYSTOLIC BLOOD PRESSURE: 149 MMHG | TEMPERATURE: 98.2 F | HEART RATE: 79 BPM | WEIGHT: 138 LBS | DIASTOLIC BLOOD PRESSURE: 70 MMHG | BODY MASS INDEX: 22.18 KG/M2

## 2024-12-15 DIAGNOSIS — S09.90XA CLOSED HEAD INJURY, INITIAL ENCOUNTER: Primary | ICD-10-CM

## 2024-12-15 PROCEDURE — 72125 CT NECK SPINE W/O DYE: CPT | Performed by: RADIOLOGY

## 2024-12-15 PROCEDURE — 72125 CT NECK SPINE W/O DYE: CPT

## 2024-12-15 PROCEDURE — 99284 EMERGENCY DEPT VISIT MOD MDM: CPT | Mod: 25

## 2024-12-15 PROCEDURE — 70450 CT HEAD/BRAIN W/O DYE: CPT

## 2024-12-15 PROCEDURE — 70450 CT HEAD/BRAIN W/O DYE: CPT | Performed by: RADIOLOGY

## 2024-12-15 PROCEDURE — 2500000001 HC RX 250 WO HCPCS SELF ADMINISTERED DRUGS (ALT 637 FOR MEDICARE OP): Performed by: PHYSICIAN ASSISTANT

## 2024-12-15 RX ORDER — OXYCODONE AND ACETAMINOPHEN 5; 325 MG/1; MG/1
1 TABLET ORAL EVERY 6 HOURS PRN
Qty: 5 TABLET | Refills: 0 | Status: SHIPPED | OUTPATIENT
Start: 2024-12-15 | End: 2024-12-18

## 2024-12-15 RX ORDER — OXYCODONE AND ACETAMINOPHEN 5; 325 MG/1; MG/1
1 TABLET ORAL ONCE
Status: COMPLETED | OUTPATIENT
Start: 2024-12-15 | End: 2024-12-15

## 2024-12-15 ASSESSMENT — LIFESTYLE VARIABLES
HAVE PEOPLE ANNOYED YOU BY CRITICIZING YOUR DRINKING: NO
EVER FELT BAD OR GUILTY ABOUT YOUR DRINKING: NO
TOTAL SCORE: 0
EVER HAD A DRINK FIRST THING IN THE MORNING TO STEADY YOUR NERVES TO GET RID OF A HANGOVER: NO
HAVE YOU EVER FELT YOU SHOULD CUT DOWN ON YOUR DRINKING: NO

## 2024-12-15 ASSESSMENT — COLUMBIA-SUICIDE SEVERITY RATING SCALE - C-SSRS
6. HAVE YOU EVER DONE ANYTHING, STARTED TO DO ANYTHING, OR PREPARED TO DO ANYTHING TO END YOUR LIFE?: NO
2. HAVE YOU ACTUALLY HAD ANY THOUGHTS OF KILLING YOURSELF?: NO
1. IN THE PAST MONTH, HAVE YOU WISHED YOU WERE DEAD OR WISHED YOU COULD GO TO SLEEP AND NOT WAKE UP?: NO

## 2024-12-15 ASSESSMENT — PAIN - FUNCTIONAL ASSESSMENT: PAIN_FUNCTIONAL_ASSESSMENT: 0-10

## 2024-12-15 ASSESSMENT — PAIN DESCRIPTION - ORIENTATION: ORIENTATION: POSTERIOR

## 2024-12-15 ASSESSMENT — PAIN DESCRIPTION - LOCATION: LOCATION: HEAD

## 2024-12-15 ASSESSMENT — PAIN SCALES - GENERAL: PAINLEVEL_OUTOF10: 8

## 2024-12-15 NOTE — ED PROVIDER NOTES
EMERGENCY MEDICINE EVALUATION NOTE    History of Present Illness     Chief Complaint:   Chief Complaint   Patient presents with    Fall     Pt slipped on ice and hit back of head. -loc, -thinners. No apparent bleeding    Head Injury       HPI: Luba Humphreys is a 64 y.o. female presents with a chief complaint of head injury.  Patient had a fall today while walking up to check the mail.  Patient reports that she slipped on ice falling down and striking the back of her head off of the ground.  She has a large hematoma over the head but denies any loss of consciousness.  Patient denies use of any anticoagulation.  Patient denies difficulty walking.  She denies any current change in her vision, nausea, vomiting.  She does have a little bit of a headache.  Patient denies anything at home for symptoms prior to arrival in the ED today.    Previous History     Past Medical History:   Diagnosis Date    Chronic back pain     Personal history of other diseases of the circulatory system     History of hypertension    Personal history of other endocrine, nutritional and metabolic disease     History of hyperlipidemia     Past Surgical History:   Procedure Laterality Date    BREAST CYST ASPIRATION Bilateral     OTHER SURGICAL HISTORY  2020     section    OTHER SURGICAL HISTORY  2020    Appendectomy    OTHER SURGICAL HISTORY  2020    Back surgery    OTHER SURGICAL HISTORY  2020    Hand surgery    OTHER SURGICAL HISTORY  2022    Cystoscopy     Social History     Tobacco Use    Smoking status: Never    Smokeless tobacco: Never   Vaping Use    Vaping status: Never Used   Substance Use Topics    Alcohol use: Yes     Alcohol/week: 1.0 standard drink of alcohol     Types: 1 Glasses of wine per week     Comment: socially    Drug use: Never     Family History   Problem Relation Name Age of Onset    Throat cancer Maternal Grandmother       Allergies   Allergen Reactions    Ibuprofen Hives and Itching  "    Current Outpatient Medications   Medication Instructions    atorvastatin (LIPITOR) 40 mg, oral, Daily    calcium citrate-vitamin D2 250 mg-2.5 mcg (100 unit) tablet 1 tablet, oral, 2 times daily    FLUoxetine (PROZAC) 40 mg, oral, Daily    losartan-hydrochlorothiazide (Hyzaar) 50-12.5 mg tablet 1 tablet, oral, Daily    oxyCODONE-acetaminophen (Percocet) 5-325 mg tablet 1 tablet, oral, Every 6 hours PRN       Physical Exam     Appearance: Alert, oriented , cooperative,  in no acute distress.      Skin: Intact,  dry skin, no lesions, rash, petechiae or purpura.  Hematoma over left superior scalp.     Eyes: PERRLA, EOMs intact,  Conjunctiva pink with no redness or exudates.      ENT: Hearing grossly intact. Pharynx clear     Neck: Supple. Trachea at midline.      Pulmonary: Clear bilaterally. No rales, rhonchi or wheezing. No accessory muscle use or stridor.     Cardiac: Normal rate and rhythm without murmur     Abdomen: Soft, nontender, active bowel sounds.     Musculoskeletal: Full range of motion.      Neurological:Cranial nerves II through XII are grossly intact, normal sensation, no weakness, no focal findings identified.     Results   Labs Reviewed - No data to display  CT head wo IV contrast   Final Result   Unremarkable exam.        Signed by: Arthur Rocha 12/15/2024 12:02 PM   Dictation workstation:   [id number]      CT cervical spine wo IV contrast   Final Result   Possible spasm and mild-to-moderate spondylosis. Otherwise no acute   findings.        Signed by: rAthur Rocha 12/15/2024 12:04 PM   Dictation workstation:   [id number]            ED Course & Medical Decision Making     Medications   oxyCODONE-acetaminophen (Percocet) 5-325 mg per tablet 1 tablet (1 tablet oral Given 12/15/24 1147)     Heart Rate:  [79]   Temperature:  [36.8 °C (98.2 °F)]   Respirations:  [18]   BP: (149)/(70)   Height:  [167.6 cm (5' 6\")]   Weight:  [62.6 kg (138 lb)]   Pulse Ox:  [98 %]    ED Course as of 12/15/24 1218 "   Sun Dec 15, 2024   1216 Updated patient on the results of the CT imaging.  There is no acute intracranial finding.  Patient's no depressible fracture.  No fracture cervical spine.  Discussed plan of care going forward.  Likely just hematoma.  Patient we discharged at this time with pain medication as she is having some intermittent pain.  Patient provided oxycodone here in the emergency department which did improve her symptoms.  She was encouraged to follow-up with primary care provider in 1 to 2 days return here immediately with any worsening symptoms. [CJ]      ED Course User Index  [CJ] Antonio Mark PA-C         Diagnoses as of 12/15/24 1218   Closed head injury, initial encounter       Procedures   Procedures    Diagnosis     1. Closed head injury, initial encounter        Disposition   Discharge    ED Prescriptions       Medication Sig Dispense Start Date End Date Auth. Provider    oxyCODONE-acetaminophen (Percocet) 5-325 mg tablet Take 1 tablet by mouth every 6 hours if needed for severe pain (7 - 10) for up to 3 days. 5 tablet 12/15/2024 12/18/2024 Antonio Mark PA-C            Disclaimer: This note was dictated by speech recognition. Minor errors in transcription may be present. Please call if questions.       Antonio Mark PA-C  12/15/24 1218

## 2025-01-07 ENCOUNTER — OFFICE VISIT (OUTPATIENT)
Dept: PAIN MEDICINE | Facility: HOSPITAL | Age: 65
End: 2025-01-07
Payer: MEDICARE

## 2025-01-07 VITALS
WEIGHT: 138 LBS | HEART RATE: 77 BPM | RESPIRATION RATE: 16 BRPM | OXYGEN SATURATION: 98 % | DIASTOLIC BLOOD PRESSURE: 69 MMHG | SYSTOLIC BLOOD PRESSURE: 100 MMHG | BODY MASS INDEX: 22.27 KG/M2

## 2025-01-07 DIAGNOSIS — M46.1 SACROILIITIS (CMS-HCC): Primary | ICD-10-CM

## 2025-01-07 DIAGNOSIS — M96.1 POSTLAMINECTOMY SYNDROME: ICD-10-CM

## 2025-01-07 PROCEDURE — 99214 OFFICE O/P EST MOD 30 MIN: CPT | Performed by: CLINICAL NURSE SPECIALIST

## 2025-01-07 PROCEDURE — 1160F RVW MEDS BY RX/DR IN RCRD: CPT | Performed by: CLINICAL NURSE SPECIALIST

## 2025-01-07 PROCEDURE — 1036F TOBACCO NON-USER: CPT | Performed by: CLINICAL NURSE SPECIALIST

## 2025-01-07 PROCEDURE — G2211 COMPLEX E/M VISIT ADD ON: HCPCS | Performed by: CLINICAL NURSE SPECIALIST

## 2025-01-07 PROCEDURE — 1159F MED LIST DOCD IN RCRD: CPT | Performed by: CLINICAL NURSE SPECIALIST

## 2025-01-07 ASSESSMENT — COLUMBIA-SUICIDE SEVERITY RATING SCALE - C-SSRS
1. IN THE PAST MONTH, HAVE YOU WISHED YOU WERE DEAD OR WISHED YOU COULD GO TO SLEEP AND NOT WAKE UP?: NO
2. HAVE YOU ACTUALLY HAD ANY THOUGHTS OF KILLING YOURSELF?: NO
6. HAVE YOU EVER DONE ANYTHING, STARTED TO DO ANYTHING, OR PREPARED TO DO ANYTHING TO END YOUR LIFE?: NO

## 2025-01-07 ASSESSMENT — ENCOUNTER SYMPTOMS
LOSS OF SENSATION IN FEET: 0
OCCASIONAL FEELINGS OF UNSTEADINESS: 0
DEPRESSION: 0

## 2025-01-07 NOTE — PROGRESS NOTES
Subjective   Patient ID: Luba Humphreys is a 64 y.o. female who presents for lumbar radicular pain  HPI    64-year-old female presents with chronic low back pain radiating into her lower extremities as well as SI joint pain.  History of an L5-S1 microdiscectomy about 15 years ago.  Lumbar pain radiating most often to her left buttock and left lower extremity.  She has completed a formal course of physical therapy and continues to consistently do home therapy exercises and stretches.  MRI consistent with degenerative changes worse at L1-2 with some disc desiccation as well as some type II Modic changes at that level with mild central canal stenosis; no high-grade foraminal or canal stenosis.  She has done previous bilateral SI joint injections which provided some temporary relief.  Continue to experience lumbar radicular pain most noted in her left lower extremity and proceeded with a caudal epidural steroid injection.  Lateral epidural steroid injections have provided significant/sustained relief of lumbar radicular symptoms most noted on the left.  She had noted an increase in lumbar radicular symptoms radiating to her left lower extremity and repeated her caudal epidural steroid injection on 12/6/2024.  Presenting at today's office visit for follow-up after repeating this injection.  Patient states that she received greater than 60% relief from her recent caudal epidural steroid injection.  She currently denies radicular pain in her left lower extremity.  She is most bothered by low back pain with radiation to her right buttock and right groin.  Occasionally this pain will radiate to her right anterior thigh.  Currently she is having difficulty going from seated to standing position and going up stairs secondary to increasing right sided pain.  She denies any numbness/tingling or weakness in her lower legs.  Denies any changes in bowel/bladder function.  She has continued home therapy stretches and exercises  targeting her low back.  She manages her pain with occasional Tylenol.    Location of Pain: Here for injection follow up. Low back that radiates into the right groin and anterior thigh. Fell a few weeks ago.          Pain Score: 0/10-sitting                          7/10- stairs/walking    Other pain medication/neuromodulator: tylenol prn    Injections and/or Procedures: SHRUTHI 5/9/24, 8/23/24,  12/6/24 Wqxhbq-24-55% relief     Other: PT 6 weeks-feb 2024-did not help, ices back every AM    OA 3/4/24                  OARRS:  ShaistaSUSAN Espino, APRN-CNP, APRN-CNS on 1/7/2025  1:21 PM  I have personally reviewed the OARRS report for Luba Humphreys. I have considered the risks of abuse, dependence, addiction and diversion    Is the patient prescribed a combination of a benzodiazepine and opioid?  No    Last Urine Drug Screen / ordered today: No  No results found for this or any previous visit (from the past 8760 hours).  N/A    Controlled Substance Agreement:  Date of the Last Agreement:       Review of Systems      ROS:   General: No fevers, chills, weight loss  Skin: Negative for lesions  Eyes: No acute vision changes  Ears: No vertigo  Nose, mouth, throat: No difficulty swallowing or speaking  Respiratory: No cough, shortness of breath, cyanosis  Cardiovascular: Negative for chest pain syncope or palpitation  Gastrointestinal: No constipation, nausea, vomiting  Neurological: Negative for headache,   Psychological: Negative for severe or debilitating anxiety, depression. Negative memory loss  Musculoskeletal: Positive for arthralgia, myalgia and pain  Endocrine: Negative for weight gain, appetite changes, excessive sweating  Allergy/immune: Negative    All 13 systems were reviewed and are within normal levels except as noted or in the history of present illness.  Positive or pertinent negative responses are noted or were in the history of present illness. As noted, the patient denies significant or impairing weakness in  the bilateral upper and lower extremities, medication induced constipation, and bowel or bladder incontinence.     Current Outpatient Medications   Medication Instructions    atorvastatin (LIPITOR) 40 mg, Daily    calcium citrate-vitamin D2 250 mg-2.5 mcg (100 unit) tablet 1 tablet, 2 times daily    FLUoxetine (PROZAC) 40 mg, oral, Daily    losartan-hydrochlorothiazide (Hyzaar) 50-12.5 mg tablet 1 tablet, Daily        Past Medical History:   Diagnosis Date    Chronic back pain     Personal history of other diseases of the circulatory system     History of hypertension    Personal history of other endocrine, nutritional and metabolic disease     History of hyperlipidemia        Past Surgical History:   Procedure Laterality Date    BREAST CYST ASPIRATION Bilateral     OTHER SURGICAL HISTORY  2020     section    OTHER SURGICAL HISTORY  2020    Appendectomy    OTHER SURGICAL HISTORY  2020    Back surgery    OTHER SURGICAL HISTORY  2020    Hand surgery    OTHER SURGICAL HISTORY  2022    Cystoscopy        Family History   Problem Relation Name Age of Onset    Throat cancer Maternal Grandmother          Allergies   Allergen Reactions    Ibuprofen Hives and Itching        No results found for this or any previous visit from the past 1000 days.      Objective     Vitals:    25 1308   BP: 100/69   Pulse: 77   Resp: 16   SpO2: 98%               Physical Exam    GENERAL EXAM  Vital Signs: Vital signs to include heart rate, respiration rate, blood pressure, and temperature were reviewed.  General Appearance:  Awake, alert, healthy appearing, well developed, No acute distress.  Head: Normocephalic without evidence of head injury.  Neck: The appearance of the neck was normal without swelling with a midline trachea.  Eyes: The eyelids and eyebrows exhibited no abnormalities.  Pupils were not pin-point.  Sclera was without icterus.  Lungs: Respiration rhythm and depth was normal.   Respiratory movements were normal without labored breathing.  Cardiovascular: No peripheral edema was present.    Spine, lumbar: The patient is able to rise from seated to standing position without hesitancy, push off or delay.  Gait is nonantalgic.  Minimal tenderness lumbar paraspinous musculature.  Flexion and extension intact.  Negative straight leg raise bilaterally.  Positive for pinpoint tenderness over right SI joint.  Positive Ijeoma, Gaenslen's and sacral thrust on the right.  Negative logroll bilaterally.  Neurological: Patient was oriented to time, place, and person.  Speech was normal.  Balance, gait, and stance were unremarkable.  Equal strength bilateral lower extremities 5/5.  Psychiatric: Appearance was normal with appropriate dress.  Mood was euthymic and affect was normal.  Skin: Affected regions were without ecchymosis or skin lesions.            Assessment/Plan   Problem List Items Addressed This Visit             ICD-10-CM    Postlaminectomy syndrome M96.1     64-year-old female with history of prior L5-S1 microdiscectomy and symptoms consistent with lumbar postlaminectomy syndrome presents for follow-up. Previous review of MRI consistent with degenerative changes worse at L1-2 with some disc desiccation as well as some type II Modic changes at that level with some mild central canal stenosis.  Previously completed formal physical therapy and consistently does home therapy exercises and stretches daily targeting her low back symptoms.  She has done previous SI joint injections with temporary relief and noted improvement in SI pain.  She has done well with caudal epidural steroid injections getting left-sided lumbar radicular symptoms and lumbar postlaminectomy syndrome.  She recently repeated her caudal epidural steroid injection on 12/6/2024.  Presenting at today's office visit for follow-up after repeating this injection.  Currently experiencing complete resolution of lumbar radicular symptoms  in her left lower extremity.  Currently is experiencing low back pain radiating into her right buttock and right groin consistent with right-sided sacroiliitis.  This pain is most bothersome at today's visit.  She is having difficulty going from seated to standing position as well as going up steps secondary to right-sided pain.  Pinpoint tenderness over her right SI joint as well as positive provocative maneuvers on the right consistent with a diagnosis of sacroiliitis.  Recommended patient proceed with a formal course of physical therapy to learn appropriate exercises and stretches targeting SI pain.  She would like to do aqua therapy and learn appropriate exercises so that she may continue water therapy on her own.  Advised patient to continue these exercises for approximately 1 month and if she does not receive relief to contact our office and we may proceed with a right sided SI joint injection.  At this point it appears that she continues to receive significant relief from her caudal epidural steroid injection.  She may repeat this injection every 3 to 4 months as needed.  Again discussed the addition of gabapentin for neuropathic pain relief.  She would like to hold off on additional medication.  Plan reviewed with patient at today's visit.    -Sending patient for formal course of aqua therapy to learn appropriate exercises so that she may continue water therapy on her own.  Advised patient to complete at least 4 weeks of water therapy targeting SI pain.  If she fails to receive relief with water therapy she may proceed with a right sided SI joint injection under fluoroscopic guidance.  -Advised patient that she may repeat her caudal epidural steroid injection every 3 to 4 months as needed.  He is currently experiencing significant relief from her recent caudal epidural steroid injection.  -The patient may benefit from a neuromodulator such as gabapentin in the future if needed.  Currently continues to manage  her pain with occasional Tylenol.  -Patient will follow-up in our office as needed.               Sacroiliitis (CMS-HCC) - Primary M46.1    Relevant Orders    Referral to Physical Therapy              This note was generated with the aid of dictation software, there may be typos despite my attempts at proofreading.

## 2025-01-07 NOTE — ASSESSMENT & PLAN NOTE
64-year-old female with history of prior L5-S1 microdiscectomy and symptoms consistent with lumbar postlaminectomy syndrome presents for follow-up. Previous review of MRI consistent with degenerative changes worse at L1-2 with some disc desiccation as well as some type II Modic changes at that level with some mild central canal stenosis.  Previously completed formal physical therapy and consistently does home therapy exercises and stretches daily targeting her low back symptoms.  She has done previous SI joint injections with temporary relief and noted improvement in SI pain.  She has done well with caudal epidural steroid injections getting left-sided lumbar radicular symptoms and lumbar postlaminectomy syndrome.  She recently repeated her caudal epidural steroid injection on 12/6/2024.  Presenting at today's office visit for follow-up after repeating this injection.  Currently experiencing complete resolution of lumbar radicular symptoms in her left lower extremity.  Currently is experiencing low back pain radiating into her right buttock and right groin consistent with right-sided sacroiliitis.  This pain is most bothersome at today's visit.  She is having difficulty going from seated to standing position as well as going up steps secondary to right-sided pain.  Pinpoint tenderness over her right SI joint as well as positive provocative maneuvers on the right consistent with a diagnosis of sacroiliitis.  Recommended patient proceed with a formal course of physical therapy to learn appropriate exercises and stretches targeting SI pain.  She would like to do aqua therapy and learn appropriate exercises so that she may continue water therapy on her own.  Advised patient to continue these exercises for approximately 1 month and if she does not receive relief to contact our office and we may proceed with a right sided SI joint injection.  At this point it appears that she continues to receive significant relief from  her caudal epidural steroid injection.  She may repeat this injection every 3 to 4 months as needed.  Again discussed the addition of gabapentin for neuropathic pain relief.  She would like to hold off on additional medication.  Plan reviewed with patient at today's visit.    -Sending patient for formal course of aqua therapy to learn appropriate exercises so that she may continue water therapy on her own.  Advised patient to complete at least 4 weeks of water therapy targeting SI pain.  If she fails to receive relief with water therapy she may proceed with a right sided SI joint injection under fluoroscopic guidance.  -Advised patient that she may repeat her caudal epidural steroid injection every 3 to 4 months as needed.  He is currently experiencing significant relief from her recent caudal epidural steroid injection.  -The patient may benefit from a neuromodulator such as gabapentin in the future if needed.  Currently continues to manage her pain with occasional Tylenol.  -Patient will follow-up in our office as needed.

## 2025-01-20 ENCOUNTER — TELEPHONE (OUTPATIENT)
Facility: CLINIC | Age: 65
End: 2025-01-20
Payer: MEDICARE

## 2025-01-20 NOTE — TELEPHONE ENCOUNTER
.  [facility] OPEN ACCESS COLONOSCOPY SCREENING FORM       Last Colonoscopy? Where: [place]  When: 2018?  ANESTHESIA SCREENING   1. Height 5'6     Weight 138  BMI 22.27    (Clinic Visit if BMI over 40)   2. Are you on any blood thinning medications including  mg? No  ( Clinic visit if yes)  3. Are you on oxygen at home? no (Clinic visit if yes)   4. Have you seen your primary care provider within the last 12 months? 1.16.2025 (Clinic visit if NO)   5. History of:   Pacemaker/Defibrillator no                          Heart Failure no                         Heart Disease no                          Stroke no   Details of cardiac history: HTN, HLD  (Clinic visit if history of heart failure or new diagnosis/new intervention in last year)     6. Do you have renal failure or require dialysis? no  7. Do you have sleep Apnea? no  8. Are you on insulin for diabetes? no If yes, patient should discuss shannon-procedural medication adjustment with PCP.   9. Are you currently on SGLT2 inhibitors? no  10. Do you have a history of seizures? no (If YES- indicate recent or NOT recent. Anesthesia to review if recent.)    GI SCREENING   Have you had a positive stool based screening test? (i.e. fecal occult, FIT, or Cologuard) no   ? If yes and pass anesthesia screen, no further questions are needed. Schedule OA procedure.   ? If yes and they do NOT pass anesthesia screen then refer to office for expedited review/visit.   ? If no, proceed to the following GI screening questions to determine if office visit is needed.      If patient answers YES to any of the following please send to the office for an appointment.  1. Do you have chronic diarrhea lasting longer than 3 weeks? no   2. Do you have a large amount of rectal bleeding or frequent rectal bleeding? no  3. Do you have significant, unexplained weight loss? no      Open Access APPROVED yes    Patient is scheduled with Dr. Fran Belcher on 2.13.2025. Packet with [employer]  mailed to the Patient.

## 2025-02-13 ENCOUNTER — APPOINTMENT (OUTPATIENT)
Dept: GASTROENTEROLOGY | Facility: HOSPITAL | Age: 65
End: 2025-02-13
Payer: MEDICARE

## 2025-02-25 ENCOUNTER — PREP FOR PROCEDURE (OUTPATIENT)
Facility: CLINIC | Age: 65
End: 2025-02-25
Payer: MEDICARE

## 2025-02-25 ENCOUNTER — ANESTHESIA EVENT (OUTPATIENT)
Dept: GASTROENTEROLOGY | Facility: HOSPITAL | Age: 65
End: 2025-02-25
Payer: MEDICARE

## 2025-02-27 ENCOUNTER — HOSPITAL ENCOUNTER (OUTPATIENT)
Dept: GASTROENTEROLOGY | Facility: HOSPITAL | Age: 65
Discharge: HOME | End: 2025-02-27
Payer: MEDICARE

## 2025-02-27 ENCOUNTER — ANESTHESIA (OUTPATIENT)
Dept: GASTROENTEROLOGY | Facility: HOSPITAL | Age: 65
End: 2025-02-27
Payer: MEDICARE

## 2025-02-27 VITALS
BODY MASS INDEX: 22.02 KG/M2 | RESPIRATION RATE: 16 BRPM | WEIGHT: 137 LBS | DIASTOLIC BLOOD PRESSURE: 62 MMHG | HEART RATE: 65 BPM | SYSTOLIC BLOOD PRESSURE: 102 MMHG | HEIGHT: 66 IN | TEMPERATURE: 97.7 F | OXYGEN SATURATION: 95 %

## 2025-02-27 DIAGNOSIS — Z12.11 SCREENING FOR COLON CANCER: ICD-10-CM

## 2025-02-27 PROBLEM — I10 HTN (HYPERTENSION): Status: ACTIVE | Noted: 2025-02-27

## 2025-02-27 PROCEDURE — 45380 COLONOSCOPY AND BIOPSY: CPT | Performed by: INTERNAL MEDICINE

## 2025-02-27 PROCEDURE — 3700000002 HC GENERAL ANESTHESIA TIME - EACH INCREMENTAL 1 MINUTE

## 2025-02-27 PROCEDURE — 7100000010 HC PHASE TWO TIME - EACH INCREMENTAL 1 MINUTE

## 2025-02-27 PROCEDURE — 2500000004 HC RX 250 GENERAL PHARMACY W/ HCPCS (ALT 636 FOR OP/ED): Performed by: NURSE ANESTHETIST, CERTIFIED REGISTERED

## 2025-02-27 PROCEDURE — 3700000001 HC GENERAL ANESTHESIA TIME - INITIAL BASE CHARGE

## 2025-02-27 PROCEDURE — 7100000009 HC PHASE TWO TIME - INITIAL BASE CHARGE

## 2025-02-27 RX ORDER — PROPOFOL 10 MG/ML
INJECTION, EMULSION INTRAVENOUS AS NEEDED
Status: DISCONTINUED | OUTPATIENT
Start: 2025-02-27 | End: 2025-02-27

## 2025-02-27 RX ORDER — SODIUM CHLORIDE 0.9 % (FLUSH) 0.9 %
SYRINGE (ML) INJECTION AS NEEDED
Status: DISCONTINUED | OUTPATIENT
Start: 2025-02-27 | End: 2025-02-27

## 2025-02-27 RX ORDER — LIDOCAINE HYDROCHLORIDE 20 MG/ML
INJECTION, SOLUTION EPIDURAL; INFILTRATION; INTRACAUDAL; PERINEURAL AS NEEDED
Status: DISCONTINUED | OUTPATIENT
Start: 2025-02-27 | End: 2025-02-27

## 2025-02-27 RX ADMIN — PROPOFOL 290 MG: 10 INJECTION, EMULSION INTRAVENOUS at 08:33

## 2025-02-27 RX ADMIN — Medication 5 ML: at 08:48

## 2025-02-27 RX ADMIN — LIDOCAINE HYDROCHLORIDE 80 MG: 20 INJECTION, SOLUTION EPIDURAL; INFILTRATION; INTRACAUDAL; PERINEURAL at 08:33

## 2025-02-27 SDOH — HEALTH STABILITY: MENTAL HEALTH: CURRENT SMOKER: 0

## 2025-02-27 ASSESSMENT — PAIN SCALES - GENERAL
PAINLEVEL_OUTOF10: 0 - NO PAIN
PAINLEVEL_OUTOF10: 0 - NO PAIN
PAIN_LEVEL: 0
PAINLEVEL_OUTOF10: 0 - NO PAIN
PAINLEVEL_OUTOF10: 0 - NO PAIN

## 2025-02-27 ASSESSMENT — PAIN - FUNCTIONAL ASSESSMENT
PAIN_FUNCTIONAL_ASSESSMENT: 0-10

## 2025-02-27 NOTE — ANESTHESIA PREPROCEDURE EVALUATION
Patient: Luba Humphreys    Procedure Information       Date/Time: 02/27/25 0830    Scheduled providers: Fran Belcher MD    Procedure: COLONOSCOPY    Location: [facility]            Relevant Problems   Anesthesia (within normal limits)      Cardiac   (+) HTN (hypertension)      Pulmonary (within normal limits)      Neuro   (+) Lumbar neuritis      GI (within normal limits)      /Renal (within normal limits)      Liver (within normal limits)      Endocrine (within normal limits)      Hematology (within normal limits)      Musculoskeletal (within normal limits)      HEENT (within normal limits)      ID (within normal limits)      Skin (within normal limits)      GYN (within normal limits)       Clinical information reviewed:   Tobacco  Allergies  Meds   Med Hx  Surg Hx  OB Status  Fam Hx  Soc   Hx        NPO Detail:  No data recorded     Physical Exam    Airway  Mallampati: I  TM distance: >3 FB  Neck ROM: full     Cardiovascular    Dental - normal exam     Pulmonary    Abdominal        Anesthesia Plan    History of general anesthesia?: yes  History of complications of general anesthesia?: no    ASA 2     MAC     The patient is not a current smoker.  Patient was previously instructed to abstain from smoking on day of procedure.  Patient did not smoke on day of procedure.    intravenous induction   Anesthetic plan and risks discussed with patient.  Use of blood products discussed with patient who consented to blood products.    Plan discussed with CRNA.

## 2025-02-27 NOTE — H&P
Pre-sedation Evaluation:  Sedation Necessary For: Analgesia  Sedation to be Managed By: Anesthesia (Monitored Anesthesia Care/MAC)    History of Present Illness and Indication for Procedure      Luba Humphreys is a 64 y.o. female with a history of HTN, postlaminectomy syndrome, and HLD who presents for OPEN ACCESS colonoscopy requested by her PCP for the purposes of screening.    She previously had a colonoscopy around 2018 that she says was normal. There is no family history of colorectal cancer.        NPO guidelines met: Yes         Review of Systems  Constitutional:  Negative for chills, fever and unexpected weight change.   HENT:  Negative for trouble swallowing.    Respiratory:  Negative for shortness of breath.    Cardiovascular:  Negative for chest pain.   Gastrointestinal:  As above.   Skin:  Negative for color change.       I performed a complete 10 point review of systems and it is negative except as noted in HPI or above. All other systems have been reviewed and are negative.      Patient Active Problem List   Diagnosis    Postlaminectomy syndrome    Lumbar neuritis    Sacroiliitis (CMS-HCC)       Past Medical History:  She has a past medical history of Chronic back pain, Personal history of other diseases of the circulatory system, and Personal history of other endocrine, nutritional and metabolic disease.    Past Surgical History:  She has a past surgical history that includes Other surgical history (02/04/2020); Other surgical history (02/04/2020); Other surgical history (02/04/2020); Other surgical history (02/04/2020); Other surgical history (09/14/2022); and Breast cyst aspiration (Bilateral).      Social History:  She reports that she has never smoked. She has never used smokeless tobacco. She reports current alcohol use of about 1.0 standard drink of alcohol per week. She reports that she does not use drugs.    Family History:  Family History   Problem Relation Name Age of Onset    Throat  "cancer Maternal Grandmother          Allergies:  Ibuprofen    Current Medications  Current Outpatient Medications on File Prior to Encounter   Medication Sig Dispense Refill    atorvastatin (Lipitor) 40 mg tablet Take 1 tablet (40 mg) by mouth once daily.      calcium citrate-vitamin D2 250 mg-2.5 mcg (100 unit) tablet Take 1 tablet by mouth 2 times a day.      FLUoxetine (PROzac) 40 mg capsule Take 1 capsule (40 mg) by mouth once daily.      losartan-hydrochlorothiazide (Hyzaar) 50-12.5 mg tablet Take 1 tablet by mouth once daily.       No current facility-administered medications on file prior to encounter.         Last Recorded Vitals  Blood pressure 133/67, pulse 71, temperature 36.8 °C (98.3 °F), temperature source Temporal, resp. rate 18, height 1.676 m (5' 6\"), weight 62.1 kg (137 lb), SpO2 97%.      Physical Exam  Vitals reviewed.   Constitutional:       General: She is not in acute distress.     Appearance: She is not ill-appearing.   HENT:      Mouth/Throat:      Comments: Mallampati: II  Cardiovascular:      Rate and Rhythm: Normal rate and regular rhythm.      Heart sounds: Normal heart sounds. No murmur heard.  Pulmonary:      Effort: Pulmonary effort is normal. No respiratory distress.      Breath sounds: Normal breath sounds. No wheezing.   Abdominal:      General: Bowel sounds are normal.      Palpations: Abdomen is soft.      Tenderness: There is no abdominal tenderness.   Skin:     General: Skin is warm and dry.   Neurological:      General: No focal deficit present.      Mental Status: She is alert and oriented to person, place, and time.   Psychiatric:         Mood and Affect: Mood normal.         Behavior: Behavior normal.         Thought Content: Thought content normal.         Judgment: Judgment normal.              Assessment/Plan     Colonoscopy in endo with MAC sedation, ASA 2        Level of Sedation: Moderate Sedation  (Sedation medications to be delivered via monitored anesthesia care " (MAC).     This evaluation serves as my H&P.     Outpatient medication list and allergies have been reviewed.  Pre-procedure/shannon procedure antibiotics not needed.     Pre-procedure evaluation completed by physician.           Fran Belcher MD

## 2025-02-27 NOTE — ANESTHESIA POSTPROCEDURE EVALUATION
Patient: Luba Humphreys    Procedure Summary       Date: 02/27/25 Room / Location: [facility]    Anesthesia Start: 0825 Anesthesia Stop: 0855    Procedure: COLONOSCOPY Diagnosis: Screening for colon cancer    Scheduled Providers: Fran Belcher MD Responsible Provider: SHAN Verdin    Anesthesia Type: MAC ASA Status: 2            Anesthesia Type: MAC    Vitals Value Taken Time   BP 91/51 02/27/25 0851   Temp 36.8 °C (98.2 °F) 02/27/25 0851   Pulse 62 02/27/25 0851   Resp 16 02/27/25 0851   SpO2 97 % 02/27/25 0851       Anesthesia Post Evaluation    Patient location during evaluation: PACU  Patient participation: complete - patient participated  Level of consciousness: responsive to verbal stimuli  Pain score: 0  Pain management: adequate  Airway patency: patent  Cardiovascular status: acceptable  Respiratory status: acceptable  Hydration status: acceptable  Postoperative Nausea and Vomiting: none    There were no known notable events for this encounter.

## 2025-02-28 NOTE — ADDENDUM NOTE
Encounter addended by: Nirmal Alvarez RN on: 2/28/2025 10:16 AM   Actions taken: Contacts section saved, Flowsheet accepted

## 2025-03-06 LAB
LABORATORY COMMENT REPORT: NORMAL
PATH REPORT.FINAL DX SPEC: NORMAL
PATH REPORT.GROSS SPEC: NORMAL
PATH REPORT.RELEVANT HX SPEC: NORMAL
PATH REPORT.TOTAL CANCER: NORMAL

## 2025-03-12 ENCOUNTER — OFFICE VISIT (OUTPATIENT)
Dept: PAIN MEDICINE | Facility: HOSPITAL | Age: 65
End: 2025-03-12
Payer: MEDICARE

## 2025-03-12 VITALS
RESPIRATION RATE: 16 BRPM | OXYGEN SATURATION: 96 % | BODY MASS INDEX: 22.27 KG/M2 | WEIGHT: 138 LBS | HEART RATE: 78 BPM | SYSTOLIC BLOOD PRESSURE: 114 MMHG | DIASTOLIC BLOOD PRESSURE: 70 MMHG

## 2025-03-12 DIAGNOSIS — M96.1 POSTLAMINECTOMY SYNDROME: ICD-10-CM

## 2025-03-12 DIAGNOSIS — M46.1 SACROILIITIS (CMS-HCC): Primary | ICD-10-CM

## 2025-03-12 PROCEDURE — 3074F SYST BP LT 130 MM HG: CPT | Performed by: CLINICAL NURSE SPECIALIST

## 2025-03-12 PROCEDURE — 1160F RVW MEDS BY RX/DR IN RCRD: CPT | Performed by: CLINICAL NURSE SPECIALIST

## 2025-03-12 PROCEDURE — 99214 OFFICE O/P EST MOD 30 MIN: CPT | Performed by: CLINICAL NURSE SPECIALIST

## 2025-03-12 PROCEDURE — 1036F TOBACCO NON-USER: CPT | Performed by: CLINICAL NURSE SPECIALIST

## 2025-03-12 PROCEDURE — 1159F MED LIST DOCD IN RCRD: CPT | Performed by: CLINICAL NURSE SPECIALIST

## 2025-03-12 PROCEDURE — 3078F DIAST BP <80 MM HG: CPT | Performed by: CLINICAL NURSE SPECIALIST

## 2025-03-12 PROCEDURE — G2211 COMPLEX E/M VISIT ADD ON: HCPCS | Performed by: CLINICAL NURSE SPECIALIST

## 2025-03-12 RX ORDER — DIAZEPAM 5 MG/1
5 TABLET ORAL ONCE AS NEEDED
OUTPATIENT
Start: 2025-03-12

## 2025-03-12 ASSESSMENT — ENCOUNTER SYMPTOMS
DEPRESSION: 0
LOSS OF SENSATION IN FEET: 0
OCCASIONAL FEELINGS OF UNSTEADINESS: 0

## 2025-03-12 NOTE — PROGRESS NOTES
Subjective   Patient ID: Lbua Humphreys is a 64 y.o. female who presents for lumbar radicular pain and SI pain     HPI  64-year-old female with chronic low back pain radiating most often to her left buttock and left lower extremity as well as bilateral SI pain presents for follow-up.  History of an L5-S1 microdiscectomy about 15 years ago.  Lumbar MRI consistent with degenerative changes worse at L1-2 with some disc desiccation as well as some type II Modic changes at that level with some mild central canal stenosis.  No high-grade foraminal or canal stenosis noted.  Previously has done bilateral SI joint injections as well as caudal epidural steroid injections.  Bilateral SI joint injections provided relief of SI pain, however, patient had developed worsening left-sided radicular symptoms.  Upon exam after her bilateral SI joint injections patient had negative provocative maneuvers, although experienced worsening lumbar radicular symptoms on the left.  At that time she was scheduled to proceed with a caudal epidural steroid injection targeting lumbar radicular pain.  Patient received greater than 60% relief from her caudal epidural steroid injection.  Upon reevaluation, patient denied radicular pain in her left lower extremity.  At that time she was most bothered by low back pain with radiation to her right buttock to right groin consistent with a right-sided sacroiliitis.  At that point she was sent for a formal course of aqua therapy targeting right-sided sacroiliitis.  If no relief we would entertain repeating a right SI joint injection.  Also discussed adding gabapentin for neuropathic pain relief.  Patient wanted to hold off on additional medication.  Presenting at today's office visit for routine follow-up.  Continues to experience low back pain as well as pain radiating from her right buttock to her right groin and into her anterior right thigh.  She denies pain radiating to her left buttock or left lower  extremity.  She does feel that she continues to receive relief from her recent caudal epidural injection as she has no radicular pain in her left lower extremity.  Pain is aching, throbbing and can be sharp.  Patient has difficulty transitioning from seated to standing position as well as going up stairs secondary to increasing right-sided pain.  Difficulty sleeping secondary to pain while she is laying on her back and her right side.  Denies numbness/tingling or weakness in her lower extremities.  Denies changes in bowel/bladder function.  She did additional aqua therapy with limited relief.  She did have to interrupt her therapy secondary to illness.  Continues to manage her pain with occasional Tylenol.    Location of Pain: Low back that radiates into the right groin and anterior thigh. Patient states that since last visit pain is now radiating down the front of the legs          Pain Score: 6/10     Other pain medication/neuromodulator: tylenol prn     Injections and/or Procedures: SHRUTHI 5/9/24, 8/23/24,  12/6/24 Eumcpg-60-54% relief      Other: PT 6 weeks-feb 2024-did not help, ices back every AM                OARRS:  Kimmy Espino, APRN-CNP, APRN-CNS on 3/12/2025  4:27 PM  I have personally reviewed the OARRS report for Luba Humphreys. I have considered the risks of abuse, dependence, addiction and diversion    Review of Systems      ROS:   General: No fevers, chills, weight loss  Skin: Negative for lesions  Eyes: No acute vision changes  Ears: No vertigo  Nose, mouth, throat: No difficulty swallowing or speaking  Respiratory: No cough, shortness of breath, cyanosis  Cardiovascular: Negative for chest pain syncope or palpitation  Gastrointestinal: No constipation, nausea, vomiting  Neurological: Negative for headache,   Psychological: Negative for severe or debilitating anxiety, depression. Negative memory loss  Musculoskeletal: Positive for arthralgia, myalgia and pain  Endocrine: Negative for weight gain,  appetite changes, excessive sweating  Allergy/immune: Negative    All 13 systems were reviewed and are within normal levels except as noted or in the history of present illness.  Positive or pertinent negative responses are noted or were in the history of present illness. As noted, the patient denies significant or impairing weakness in the bilateral upper and lower extremities, medication induced constipation, and bowel or bladder incontinence.     Current Outpatient Medications   Medication Instructions    atorvastatin (LIPITOR) 40 mg, Daily    calcium citrate-vitamin D2 250 mg-2.5 mcg (100 unit) tablet 1 tablet, 2 times daily    FLUoxetine (PROZAC) 40 mg, Daily    losartan-hydrochlorothiazide (Hyzaar) 50-12.5 mg tablet 1 tablet, Daily        Past Medical History:   Diagnosis Date    Chronic back pain     Personal history of other diseases of the circulatory system     History of hypertension    Personal history of other endocrine, nutritional and metabolic disease     History of hyperlipidemia        Past Surgical History:   Procedure Laterality Date    BREAST CYST ASPIRATION Bilateral     OTHER SURGICAL HISTORY  2020     section    OTHER SURGICAL HISTORY  2020    Appendectomy    OTHER SURGICAL HISTORY  2020    Back surgery    OTHER SURGICAL HISTORY  2020    Hand surgery    OTHER SURGICAL HISTORY  2022    Cystoscopy        Family History   Problem Relation Name Age of Onset    Throat cancer Maternal Grandmother          Allergies   Allergen Reactions    Ibuprofen Hives and Itching        No results found for this or any previous visit from the past 1000 days.      Objective     Vitals:    25 1505   BP: 114/70   Pulse: 78   Resp: 16   SpO2: 96%               Physical Exam    GENERAL EXAM  Vital Signs: Vital signs to include heart rate, respiration rate, blood pressure, and temperature were reviewed.  General Appearance:  Awake, alert, healthy appearing, well developed,  No acute distress.  Head: Normocephalic without evidence of head injury.  Neck: The appearance of the neck was normal without swelling with a midline trachea.  Eyes: The eyelids and eyebrows exhibited no abnormalities.  Pupils were not pin-point.  Sclera was without icterus.  Lungs: Respiration rhythm and depth was normal.  Respiratory movements were normal without labored breathing.  Cardiovascular: No peripheral edema was present.    Spine, lumbar: Patient is able to rise from seated to standing position with some difficulty secondary to pain in her right buttock and right groin.  Gait is nonantalgic.  Tenderness over lumbar paraspinous musculature bilaterally.  Flexion extension intact.  Negative straight leg raise bilaterally.  Positive for pinpoint tenderness over right SI joint.  Positive Ijeoma, Gaenslen's and sacral thrust on the right.  Negative logroll bilaterally.  Equal muscle strength  Neurological: Patient was oriented to time, place, and person.  Speech was normal.  Balance, gait, and stance were unremarkable.  Bilateral lower extremities 5/5.  Psychiatric: Appearance was normal with appropriate dress.  Mood was euthymic and affect was normal.  Skin: Affected regions were without ecchymosis or skin lesions.                Assessment/Plan   Problem List Items Addressed This Visit             ICD-10-CM    Postlaminectomy syndrome M96.1    Sacroiliitis (CMS-HCC) - Primary M46.1     64-year-old female with history of prior L5-S1 microdiscectomy and sacroiliitis presents for follow-up.  Symptoms and exam consistent with lumbar postlaminectomy syndrome as well as SI pain. Previous review of MRI consistent with degenerative changes worse at L1-2 with some disc desiccation as well as some type II Modic changes at that level with some mild central canal stenosis.  She has done formal physical therapy in the past and continues home therapy exercises and stretches.  Sent for aqua therapy at her last office visit  to target low back as well as SI pain.  Minimal relief with aqua therapy. Patient previously received relief with bilateral SI injections, although continued to experience lumbar radicular pain most noted in her left lower extremity.  At that point she was sent for a caudal epidural steroid injection which provided greater than 60% relief of lumbar radicular pain.  She feels that she continues to receive relief of lumbar radicular pain as she has very little pain radiating to her left lower extremity.  Most bothered at today's office visit by low back symptoms with pain radiating from her right buttock into her right groin and intermittently to her right thigh.  Symptoms and exam appear consistent with a right sided sacroiliitis.  Positive tenderness over right SI joint, positive Ijeoma, Gaenslen's and sacral thrust on the right.  She is having difficulty going from seated to standing position as well as going up steps secondary to right-sided pain.  Reviewed patient presentation and exam with Dr. Awad suggesting the patient could benefit from a right SI joint injection.  Medical necessity: The patient is presenting primarily with pain over the Right sacroiliac joint(s) and denies any significant new radicular symptoms or weakness.  The pain is constant and of moderate severity that interferes with activities of daily living and sleep. Physical exam revealed 3 positive SI joint maneuvers. The patient failed conservative therapy and physical therapy/supervised home exercise program.  Plan is to proceed with fluoroscopic guided Right Therapeutic sacroiliac joint injection.  Will plan on utilizing no more than 2 mL of volume to each joint.  We discussed the risks, benefits and alternatives to the procedure(s) and the patient would like to proceed.  At this point it appears that she continues to receive significant relief from her caudal epidural steroid injection.  She may repeat this injection every 3 to 4 months  as needed.  Again discussed the addition of gabapentin for neuropathic pain relief.  She would like to hold off on additional medication.  Plan reviewed with patient at today's visit.    -Schedule patient for right sided SI joint injection under fluoroscopic guidance.  Reviewed risks and benefits with patient and she would like to proceed.  -Advised patient that she may repeat her caudal epidural steroid injection every 3 to 4 months as needed.  She feels that she continues to receive relief from her recent caudal epidural steroid injection as she has little radicular pain radiating to her left lower extremity.    -The patient may benefit from a neuromodulator such as gabapentin in the future if needed.  Currently continues to manage her pain with occasional Tylenol.  -Patient will follow-up in our office approximately 6 weeks after her injection for reevaluation.               Relevant Orders    [place] pain management    Sacroiliac Joint Injection              This note was generated with the aid of dictation software, there may be typos despite my attempts at proofreading.

## 2025-03-12 NOTE — ASSESSMENT & PLAN NOTE
64-year-old female with history of prior L5-S1 microdiscectomy and sacroiliitis presents for follow-up.  Symptoms and exam consistent with lumbar postlaminectomy syndrome as well as SI pain. Previous review of MRI consistent with degenerative changes worse at L1-2 with some disc desiccation as well as some type II Modic changes at that level with some mild central canal stenosis.  She has done formal physical therapy in the past and continues home therapy exercises and stretches.  Sent for aqua therapy at her last office visit to target low back as well as SI pain.  Minimal relief with aqua therapy. Patient previously received relief with bilateral SI injections, although continued to experience lumbar radicular pain most noted in her left lower extremity.  At that point she was sent for a caudal epidural steroid injection which provided greater than 60% relief of lumbar radicular pain.  She feels that she continues to receive relief of lumbar radicular pain as she has very little pain radiating to her left lower extremity.  Most bothered at today's office visit by low back symptoms with pain radiating from her right buttock into her right groin and intermittently to her right thigh.  Symptoms and exam appear consistent with a right sided sacroiliitis.  Positive tenderness over right SI joint, positive Ijeoma, Gaenslen's and sacral thrust on the right.  She is having difficulty going from seated to standing position as well as going up steps secondary to right-sided pain.  Reviewed patient presentation and exam with Dr. Awad suggesting the patient could benefit from a right SI joint injection.  Medical necessity: The patient is presenting primarily with pain over the Right sacroiliac joint(s) and denies any significant new radicular symptoms or weakness.  The pain is constant and of moderate severity that interferes with activities of daily living and sleep. Physical exam revealed 3 positive SI joint maneuvers.  The patient failed conservative therapy and physical therapy/supervised home exercise program.  Plan is to proceed with fluoroscopic guided Right Therapeutic sacroiliac joint injection.  Will plan on utilizing no more than 2 mL of volume to each joint.  We discussed the risks, benefits and alternatives to the procedure(s) and the patient would like to proceed.  At this point it appears that she continues to receive significant relief from her caudal epidural steroid injection.  She may repeat this injection every 3 to 4 months as needed.  Again discussed the addition of gabapentin for neuropathic pain relief.  She would like to hold off on additional medication.  Plan reviewed with patient at today's visit.    -Schedule patient for right sided SI joint injection under fluoroscopic guidance.  Reviewed risks and benefits with patient and she would like to proceed.  -Advised patient that she may repeat her caudal epidural steroid injection every 3 to 4 months as needed.  She feels that she continues to receive relief from her recent caudal epidural steroid injection as she has little radicular pain radiating to her left lower extremity.    -The patient may benefit from a neuromodulator such as gabapentin in the future if needed.  Currently continues to manage her pain with occasional Tylenol.  -Patient will follow-up in our office approximately 6 weeks after her injection for reevaluation.

## 2025-03-17 ENCOUNTER — APPOINTMENT (OUTPATIENT)
Dept: PAIN MEDICINE | Facility: HOSPITAL | Age: 65
End: 2025-03-17
Payer: MEDICARE

## 2025-04-01 ENCOUNTER — TELEPHONE (OUTPATIENT)
Facility: CLINIC | Age: 65
End: 2025-04-01
Payer: MEDICARE

## 2025-05-12 ENCOUNTER — APPOINTMENT (OUTPATIENT)
Dept: PAIN MEDICINE | Facility: HOSPITAL | Age: 65
End: 2025-05-12
Payer: MEDICARE

## 2025-06-05 ENCOUNTER — APPOINTMENT (OUTPATIENT)
Dept: PAIN MEDICINE | Facility: HOSPITAL | Age: 65
End: 2025-06-05
Payer: MEDICARE

## 2025-06-16 ENCOUNTER — TELEPHONE (OUTPATIENT)
Dept: PAIN MEDICINE | Facility: CLINIC | Age: 65
End: 2025-06-16
Payer: MEDICARE

## 2025-06-16 NOTE — TELEPHONE ENCOUNTER
Spoke with Luba on the phone regarding her previous SI Joint injection on 3/14/24. She states that she recalls getting 60-70% relief of her sacroiliitis symptoms for greater than 3 months. At that time she was also suffering from radicular symptoms down her legs. She cannot remember exactly when the right SI Joint pain returned but it was longer than 3 months from when she received her injection. The combination of bilateral SI Joint injection and Caudal injection has greatly reduced her low back and radicular symptoms, however she is still experiencing SI Joint pain on the right side, 6/10 pain score on average but it can sometimes go higher depending on activity. Per her exam on 3/12/25 she is positive for sacroiliitis on the right side.

## 2025-06-27 ENCOUNTER — HOSPITAL ENCOUNTER (OUTPATIENT)
Dept: GASTROENTEROLOGY | Facility: HOSPITAL | Age: 65
Discharge: HOME | End: 2025-06-27
Payer: MEDICARE

## 2025-06-27 VITALS
TEMPERATURE: 97.6 F | OXYGEN SATURATION: 98 % | SYSTOLIC BLOOD PRESSURE: 132 MMHG | DIASTOLIC BLOOD PRESSURE: 72 MMHG | WEIGHT: 138 LBS | HEIGHT: 66 IN | RESPIRATION RATE: 16 BRPM | BODY MASS INDEX: 22.18 KG/M2 | HEART RATE: 53 BPM

## 2025-06-27 DIAGNOSIS — M46.1 SACROILIITIS: ICD-10-CM

## 2025-06-27 PROCEDURE — 27096 INJECT SACROILIAC JOINT: CPT | Mod: RT | Performed by: PHYSICAL MEDICINE & REHABILITATION

## 2025-06-27 PROCEDURE — 2550000001 HC RX 255 CONTRASTS: Performed by: PHYSICAL MEDICINE & REHABILITATION

## 2025-06-27 PROCEDURE — 2500000004 HC RX 250 GENERAL PHARMACY W/ HCPCS (ALT 636 FOR OP/ED): Performed by: PHYSICAL MEDICINE & REHABILITATION

## 2025-06-27 RX ORDER — LIDOCAINE HYDROCHLORIDE 5 MG/ML
INJECTION, SOLUTION INFILTRATION; INTRAVENOUS AS NEEDED
Status: COMPLETED | OUTPATIENT
Start: 2025-06-27 | End: 2025-06-27

## 2025-06-27 RX ORDER — METHYLPREDNISOLONE ACETATE 40 MG/ML
INJECTION, SUSPENSION INTRA-ARTICULAR; INTRALESIONAL; INTRAMUSCULAR; SOFT TISSUE AS NEEDED
Status: COMPLETED | OUTPATIENT
Start: 2025-06-27 | End: 2025-06-27

## 2025-06-27 RX ADMIN — IOHEXOL 5 ML: 350 INJECTION, SOLUTION INTRAVENOUS at 09:51

## 2025-06-27 RX ADMIN — LIDOCAINE HYDROCHLORIDE 15 ML: 5 INJECTION, SOLUTION INFILTRATION at 09:50

## 2025-06-27 RX ADMIN — METHYLPREDNISOLONE ACETATE 40 MG: 40 INJECTION, SUSPENSION INTRA-ARTICULAR; INTRALESIONAL; INTRAMUSCULAR; INTRASYNOVIAL; SOFT TISSUE at 09:51

## 2025-06-27 ASSESSMENT — PAIN SCALES - GENERAL
PAINLEVEL_OUTOF10: 2
PAINLEVEL_OUTOF10: 5 - MODERATE PAIN

## 2025-06-27 ASSESSMENT — PAIN - FUNCTIONAL ASSESSMENT
PAIN_FUNCTIONAL_ASSESSMENT: 0-10
PAIN_FUNCTIONAL_ASSESSMENT: 0-10

## 2025-06-27 ASSESSMENT — PAIN DESCRIPTION - DESCRIPTORS: DESCRIPTORS: ACHING;SHARP

## 2025-06-27 NOTE — H&P
Pain Management H&P    History Of Present Illness  Luba Humphreys is a 65 y.o. female presents for procedure state below. Endorses no changes in past medical history or medical health since last seen in clinic.      Past Medical History  She has a past medical history of Chronic back pain, Personal history of other diseases of the circulatory system, and Personal history of other endocrine, nutritional and metabolic disease.    Surgical History  She has a past surgical history that includes Other surgical history (02/04/2020); Other surgical history (02/04/2020); Other surgical history (02/04/2020); Other surgical history (02/04/2020); Other surgical history (09/14/2022); and Breast cyst aspiration (Bilateral).     Social History  She reports that she has never smoked. She has never used smokeless tobacco. She reports current alcohol use of about 1.0 standard drink of alcohol per week. She reports that she does not use drugs.    Family History  Family History[1]     Allergies  Ibuprofen    Review of Symptoms:   Constitutional: Negative for chills, diaphoresis or fever  HENT: Negative for neck swelling  Eyes:.  Negative for eye pain  Respiratory:.  Negative for cough, shortness of breath or wheezing    Cardiovascular:.  Negative for chest pain or palpitations  Gastrointestinal:.  Negative for abdominal pain, nausea and vomiting  Genitourinary:.  Negative for urgency  Musculoskeletal:  Positive for back pain. Positive for joint pain. Denies falls within the past 3 months.  Skin: Negative for wounds or itching   Neurological: Negative for dizziness, seizures, loss of consciousness and weakness  Endo/Heme/Allergies: Does not bruise/bleed easily  Psychiatric/Behavioral: Negative for depression. The patient does not appear anxious.       PHYSICAL EXAM  Vitals signs reviewed  Constitutional:       General: Not in acute distress     Appearance: Normal appearance. Not ill-appearing.  HENT:     Head: Normocephalic and  atraumatic  Eyes:     Conjunctiva/sclera: Conjunctivae normal  Cardiovascular:     Rate and Rhythm: Normal rate and regular rhythm  Pulmonary:     Effort: No respiratory distress  Abdominal:     Palpations: Abdomen is soft  Musculoskeletal: ESPINOZA  Skin:     General: Skin is warm and dry  Neurological:     General: No focal deficit present  Psychiatric:         Mood and Affect: Mood normal         Behavior: Behavior normal     Last Recorded Vitals  There were no vitals taken for this visit.    Relevant Results  Current Outpatient Medications   Medication Instructions    atorvastatin (LIPITOR) 40 mg, Daily    calcium citrate-vitamin D2 250 mg-2.5 mcg (100 unit) tablet 1 tablet, 2 times daily    FLUoxetine (PROZAC) 40 mg, Daily    losartan-hydrochlorothiazide (Hyzaar) 50-12.5 mg tablet 1 tablet, Daily       No results found for this or any previous visit from the past 1000 days.     Epidural Steroid Injection  Table formatting from the original result was not included.  Procedure  Epidural Steroid Injection    Indication  Postlaminectomy syndrome, Lumbar neuritis    Medications  lidocaine (Xylocaine) 5 mg/mL (0.5 %) injection 15 mL   iohexol (OMNIPaque) 350 mg iodine/mL solution 5 mL   methylPREDNISolone acetate (DEPO-Medrol) injection 40 mg   (Totals for administrations occurring from 0837 to 0842 on 12/06/24)     Preprocedure  A history and physical has been performed, and patient medication   allergies have been reviewed. The patient's tolerance of previous   anesthesia has been reviewed. The risks and benefits of the procedure and   the sedation options and risks were discussed with the patient. All   questions were answered and informed consent obtained.    Details of the Procedure  Consent:   Consent obtained:  Written  Consent given by:  Patient  Risks, benefits, and alternatives were discussed: yes    Risks discussed:  Bleeding, infection, pain and nerve damage  Alternatives discussed:  No treatment, delayed  treatment and alternative   treatment  Universal protocol:   Procedure explained and questions answered to patient or proxy's   satisfaction: yes    Relevant documents present and verified: yes    Test results available: yes    Imaging studies available: yes    Required blood products, implants, devices, and special equipment   available: yes    Site/side marked: yes    Immediately prior to procedure, a time out was called: yes    Patient identity confirmed:  Verbally with patient, hospital-assigned   identification number and arm band  Procedure specific details:     Caudal SHRUTHI    The patient was positioned comfortably into the supine position and was   prepped and draped in the usual sterile manner.  Sterile precautions,   including sterile gloves, disposable cap and masks were worn for the   procedure at all times. Skeletal landmarks were identified under   fluoroscopy. There was no evidence of infection at the site(s) of needle   insertion.  A final time-out was done.  The skin and subcutaneous tissue   at insertion site was anesthetized with 0.5% lidocaine with or without   sodium bicarbonate.  Under fluoroscopic guidance, the needle listed below   was placed into the epidural space through the caudal approach.  If an   Epimed Catheter was used it was advanced to the below level.  Radio-opaque   contrast was utilized to confirm position.  Appropriate epidural spread   was observed without vascular uptake or spread suggestive of an   intrathecal injection.   Blood was not aspirated.  CSF was not aspirated.    Paresthesias were not elicited.  A therapeutic solution as indicated below   was injected.  If used, the catheter was flushed and the catheter and   needle were removed as a unit with tip of the catheter noted to be intact.    Pressure was held over the site until hemostasis was achieved, and after   ensuring hemostasis and the absence of hematoma, an adhesive bandage was   applied to the site of needle  insertion.  Preservative-free solutions were   utilized in the epidural space.      Needle type and catheter: 22 gauge Quincke Spinal  Level catheter advanced if used: [N/A]  Medications [9 mL of 0.5% lidocaine and 40 mg methylprednisolone]    Procedure Provider  Brain Awad MD    Procedure Location  [facility]  [address] [address] [phone]    Referring Provider  Brain Awad MD  [id number] [place] [address]       No diagnosis found.     ASSESSMENT/PLAN  Luba Humphreys is a 65 y.o. female presenting for right sacroiliac joint injection     Patient denies any recent antibiotic use or infections, denies any blood thinner use, and denies contrast or local anesthetic allergies     Risks, benefits, alternatives discussed. All questions answered to the best of my ability. Patient agrees to proceed.      Our plan is as follows:  - Proceed with aforementioned procedure          Deb Lee DO   Pain fellow          [1]   Family History  Problem Relation Name Age of Onset    Throat cancer Maternal Grandmother

## 2025-06-27 NOTE — DISCHARGE INSTRUCTIONS
DISCHARGE INSTRUCTIONS FOR INJECTIONS     After most injections, it is recommended that you relax and limit your activity for the remainder of the day unless you have been told otherwise by your pain physician.  You should not drive a car, operate machinery, or make important legal decisions unless otherwise directed by your pain physician.  You may resume your normal activity, including exercise, tomorrow.      Keep a written pain diary of how much pain relief you experienced following the injection procedure and the length of time of pain relief you experienced pain relief. Following diagnostic injections like medial branch nerve blocks, genicular nerve blocks, sacroiliac joint blocks, stellate ganglion injections and other blocks, it is very important you record the specific amount of pain relief you experienced immediately after the injection and how long it lasted. If you have been given Questionnaire paperwork to fill out out after your diagnostic block please call [phone] and leave a detailed voicemail with the answers to the questions you were given. This information is vital to getting approval for next steps.    Observe the needle site for excessive bleeding (slow general oozing that completely soaks the dressing or fresh bright red bleeding).  In either case, apply pressure to the area, elevate it if possible and call your doctor at once.      For all injections, please keep the injection site dry and inspect the site for a couple of days. You may remove the Band-Aid the day of the injection at any time.     Keep the needle site clean & dry for 24 hours.   no soaking baths, hot tubs, whirlpools or swimming pools for 2-3 days.     Some discomfort, bruising or slight swelling may occur at the injection site. This is not abnormal if it occurs.  If needed you may:    -Take over the counter medication such as Tylenol or Motrin.   -Apply an ice pack for 30 minutes, 2 to 3 times a day for the first 24  hours.     If you are given steroids in your injection, your pain may not be gone immediately after this procedure. It generally takes 3-5 days for the steroid to work but it can take up to 2 weeks. may You may notice a worsening of your symptoms for 1-2 days after the injection. This is not abnormal.  You may use acetaminophen, ibuprofen, or prescription medication that your doctor may have prescribed for you if you need to do so.     A few common side effects of steroids include facial flushing, sweating, restlessness, irritability,difficulty sleeping, increase in blood sugar, and increased blood pressure. If you have diabetes, please monitor your blood sugar at least once a day for at least 5 days. If you have poorly controlled high blood pressure, monitoryour blood pressure for at least 2 days and contact your primary care physician if these numbers are unusually high for you.        Call  Pain Management at [phone] between 8am-4pm Monday - Friday if you are experiencing the following:    If you received an epidural or spinal injection:    -Headache that does not go away with medicine, is worse when sitting or standing up, and is greatly relieved upon lying down.   -Severe pain worse than or different than your baseline pain.   -Chills or fever (101º F or greater).   -Drainage or signs of infection at the injection site     Go directly to the Emergency Department if you are experiencing the following and received an epidural or spinal injection:   -Abrupt weakness or progressive weakness in your legs that starts after you leave the clinic.   -Abrupt severe or worsening numbness in your legs.   -Inability to urinate after the injection or loss of bowel or bladder control without the urge to defecate or urinate.     If you have a clinical question that cannot wait until your next appointment, please call [phone] between 8am-4pm Monday - Friday. We do our best to return all non-emergency messages within  24-48 hours, Monday - Friday. A nurse or physician will return your message. You may also try calling and they will do their best to answer your question(s):    [facility] nurse [phone]  [facility] nurse [phone]  After hours pain management [phone]    If you need to cancel an appointment, please call the scheduling staff at [phone] during normal business hours or leave a message at least 24 hours in advance.

## 2025-07-11 ENCOUNTER — APPOINTMENT (OUTPATIENT)
Dept: GASTROENTEROLOGY | Facility: HOSPITAL | Age: 65
End: 2025-07-11
Payer: MEDICARE

## 2025-08-11 ENCOUNTER — OFFICE VISIT (OUTPATIENT)
Dept: PAIN MEDICINE | Facility: HOSPITAL | Age: 65
End: 2025-08-11
Payer: MEDICARE

## 2025-08-11 VITALS
HEIGHT: 66 IN | HEART RATE: 58 BPM | WEIGHT: 138 LBS | OXYGEN SATURATION: 97 % | RESPIRATION RATE: 16 BRPM | BODY MASS INDEX: 22.18 KG/M2 | SYSTOLIC BLOOD PRESSURE: 110 MMHG | DIASTOLIC BLOOD PRESSURE: 69 MMHG

## 2025-08-11 DIAGNOSIS — M96.1 POSTLAMINECTOMY SYNDROME: Primary | ICD-10-CM

## 2025-08-11 DIAGNOSIS — M46.1 SACROILIITIS: ICD-10-CM

## 2025-08-11 DIAGNOSIS — M54.16 LUMBAR NEURITIS: ICD-10-CM

## 2025-08-11 PROCEDURE — 1160F RVW MEDS BY RX/DR IN RCRD: CPT | Performed by: CLINICAL NURSE SPECIALIST

## 2025-08-11 PROCEDURE — 99212 OFFICE O/P EST SF 10 MIN: CPT | Performed by: CLINICAL NURSE SPECIALIST

## 2025-08-11 PROCEDURE — G2211 COMPLEX E/M VISIT ADD ON: HCPCS | Performed by: CLINICAL NURSE SPECIALIST

## 2025-08-11 PROCEDURE — 3074F SYST BP LT 130 MM HG: CPT | Performed by: CLINICAL NURSE SPECIALIST

## 2025-08-11 PROCEDURE — 3078F DIAST BP <80 MM HG: CPT | Performed by: CLINICAL NURSE SPECIALIST

## 2025-08-11 PROCEDURE — 3008F BODY MASS INDEX DOCD: CPT | Performed by: CLINICAL NURSE SPECIALIST

## 2025-08-11 PROCEDURE — 1159F MED LIST DOCD IN RCRD: CPT | Performed by: CLINICAL NURSE SPECIALIST

## 2025-08-11 PROCEDURE — 1036F TOBACCO NON-USER: CPT | Performed by: CLINICAL NURSE SPECIALIST

## 2025-08-11 PROCEDURE — 99214 OFFICE O/P EST MOD 30 MIN: CPT | Performed by: CLINICAL NURSE SPECIALIST

## 2025-08-11 ASSESSMENT — ENCOUNTER SYMPTOMS
OCCASIONAL FEELINGS OF UNSTEADINESS: 0
LOSS OF SENSATION IN FEET: 0
DEPRESSION: 0

## 2025-08-28 ENCOUNTER — APPOINTMENT (OUTPATIENT)
Dept: PAIN MEDICINE | Facility: HOSPITAL | Age: 65
End: 2025-08-28
Payer: MEDICARE

## 2025-10-29 ENCOUNTER — APPOINTMENT (OUTPATIENT)
Dept: OBSTETRICS AND GYNECOLOGY | Facility: CLINIC | Age: 65
End: 2025-10-29
Payer: COMMERCIAL